# Patient Record
Sex: FEMALE | Race: OTHER | HISPANIC OR LATINO | URBAN - METROPOLITAN AREA
[De-identification: names, ages, dates, MRNs, and addresses within clinical notes are randomized per-mention and may not be internally consistent; named-entity substitution may affect disease eponyms.]

---

## 2017-04-12 ENCOUNTER — EMERGENCY (EMERGENCY)
Facility: HOSPITAL | Age: 27
LOS: 1 days | Discharge: LEFT BEFORE DISCHARGE | End: 2017-04-12
Attending: EMERGENCY MEDICINE | Admitting: EMERGENCY MEDICINE
Payer: SELF-PAY

## 2017-04-12 VITALS
OXYGEN SATURATION: 97 % | RESPIRATION RATE: 18 BRPM | DIASTOLIC BLOOD PRESSURE: 75 MMHG | HEART RATE: 92 BPM | SYSTOLIC BLOOD PRESSURE: 108 MMHG | TEMPERATURE: 98 F

## 2017-04-12 DIAGNOSIS — R51 HEADACHE: ICD-10-CM

## 2017-04-12 PROCEDURE — 99284 EMERGENCY DEPT VISIT MOD MDM: CPT

## 2017-04-12 PROCEDURE — 99284 EMERGENCY DEPT VISIT MOD MDM: CPT | Mod: 25

## 2017-04-12 RX ORDER — KETOROLAC TROMETHAMINE 30 MG/ML
15 SYRINGE (ML) INJECTION ONCE
Qty: 0 | Refills: 0 | Status: COMPLETED | OUTPATIENT
Start: 2017-04-12 | End: 2017-04-12

## 2017-04-12 NOTE — ED PROVIDER NOTE - MEDICAL DECISION MAKING DETAILS
CT head given history of skull surgery as child w/o follow up and location, ESR, CBC, CMP, Mg Kim: 25 yo F with Hx skull surgery c/o HA x 3 days with visual deficits and right sided weakness and neck stiffness. Pt took motrin without relief.   -CT head given history of skull surgery as child w/o follow up and location, ESR, CBC, CMP, Mg, reassess

## 2017-04-12 NOTE — ED PROVIDER NOTE - REFUSAL OF SERVICE, MDM
Patient was made aware of the risks and benefits of her leaving the hospital given her symptoms. CT head was ordered to determine whether a lesion was present and to rule out acute bleed. Patient was understand risks of possible death. Patient signed AMA form in the presence of the nurse and her  prior to discharge with understanding of the above.

## 2017-04-12 NOTE — ED PROVIDER NOTE - PROGRESS NOTE DETAILS
Kim: pt accepted the toradol and headache improved but refused to have labs drawn and CT head performed as she did not want to wait. The patient is leaving against medical advice.  The patient understands the risk of leaving without further evaluation and workup.  The patient however still declines and will follow up with their primary physician or says she would like to go to [facility].

## 2017-04-12 NOTE — ED PROVIDER NOTE - OBJECTIVE STATEMENT
27 yo F p/w c/o HA x 3 days. 25 yo F w/ pmhx of skull surgery p/w c/o HA x 3 days. Patient reports having throbbing pain on unilateral R side w/ visual deficits and R sided weakness. She reports pain in the jaw and tingling of the face. She states she has decreased sensation on the R side of the face in V1-V2 distribution. Denies any recent sick contacts, travel, or recent outdoor excursions. Patient took Motrin gel capsule 2 capsules q 4-6 hours w/o relief. She states it is worse lying down. Has no history of severe headaches. Reports neck stifness on the R side only without photophobia or fevers, chills, NS. No weight loss only gain. Patient does not follow with a neurologist. Denies any flashes or floaters.

## 2019-07-21 ENCOUNTER — OUTPATIENT (OUTPATIENT)
Dept: OUTPATIENT SERVICES | Facility: HOSPITAL | Age: 29
LOS: 1 days | Discharge: HOME | End: 2019-07-21

## 2019-07-21 ENCOUNTER — EMERGENCY (EMERGENCY)
Facility: HOSPITAL | Age: 29
LOS: 1 days | Discharge: STILL ADMITTED OR ADMINISTRATIVE | End: 2019-07-21
Admitting: EMERGENCY MEDICINE

## 2019-07-21 VITALS
DIASTOLIC BLOOD PRESSURE: 64 MMHG | OXYGEN SATURATION: 98 % | SYSTOLIC BLOOD PRESSURE: 110 MMHG | TEMPERATURE: 98 F | RESPIRATION RATE: 18 BRPM | HEART RATE: 103 BPM

## 2019-07-21 VITALS
OXYGEN SATURATION: 99 % | SYSTOLIC BLOOD PRESSURE: 94 MMHG | DIASTOLIC BLOOD PRESSURE: 53 MMHG | HEART RATE: 97 BPM | TEMPERATURE: 99 F | RESPIRATION RATE: 16 BRPM

## 2019-07-21 VITALS — HEART RATE: 97 BPM | SYSTOLIC BLOOD PRESSURE: 104 MMHG | DIASTOLIC BLOOD PRESSURE: 56 MMHG

## 2019-07-21 DIAGNOSIS — O26.899 OTHER SPECIFIED PREGNANCY RELATED CONDITIONS, UNSPECIFIED TRIMESTER: ICD-10-CM

## 2019-07-21 DIAGNOSIS — Z3A.00 WEEKS OF GESTATION OF PREGNANCY NOT SPECIFIED: ICD-10-CM

## 2019-07-21 LAB

## 2019-07-21 NOTE — OB PROVIDER TRIAGE NOTE - NSOBPROVIDERNOTE_OBGYN_ALL_OB_FT
No evidence of PTL. Discussed with Dr. Robertson/    -Pt cleared for discharge home  -PTL instructions given   -Pt instructed to follow up with next scheduled appointment 8/2/2019  -Verbal and written discharge instructions given, pt verbalized understanding

## 2019-07-21 NOTE — OB PROVIDER TRIAGE NOTE - HISTORY OF PRESENT ILLNESS
Default patient 28 y/o  @27.6 weeks gestation presents to triage with c/o intermittent lower abdominal pain and back pain since yesterday. Pt complains of vaginal discharge x 2 days. Pt denies VB. Pt reports good fetal movement. Pt recives Current Ap course significant for: Vaginal bleeding at 3 months, resolved  Medical H/x: Denies  Surgical H/x:  x1  Ob/Gyn H/x://8lbs 5oz/ Failure to dilate  Psych H/x: Denies  Meds: Pnv  NKDA Default patient 28 y/o  @27.6 weeks gestation presents to triage with c/o intermittent lower abdominal pain and back pain since yesterday. Pt complains of clear vaginal discharge x 2 days. Pt denies VB. Pt reports good fetal movement. Pt receives pnc in [place]  last ob visit 6/15/2019.  Current Ap course significant for: Vaginal bleeding at 3 months, resolved  Medical H/x: Denies  Surgical H/x:  x1  Ob/Gyn H/x:2011//8lbs 5oz/ Failure to dilate  Psych H/x: Denies  Meds: Pnv  NKDA Default patient 28 y/o  @27.6 weeks gestation presents to triage with c/o intermittent lower abdominal pain and back pain since yesterday. Pt complains of clear vaginal discharge x 2 hours. Pt denies VB. Pt reports good fetal movement. Pt receives PNC in [place]  last ob visit 6/15/2019.  Current Ap course significant for: Vaginal bleeding at 3 months, resolved  Medical H/x: Denies  Surgical H/x:  x1  Ob/Gyn H/x:2011//8lbs 5oz/ Failure to dilate  Psych H/x: Denies  Meds: Pnv  NKDA

## 2019-07-21 NOTE — ED ADULT TRIAGE NOTE - CHIEF COMPLAINT QUOTE
27 weeks 2 days pregnant, MD Christianson in [place], complaining of vaginal discharge, the sensation to push, and lower abdominal pain that started yesterday night. Also complains of hand/foot swelling. OK as per Jhonatan to send upstairs.

## 2019-07-21 NOTE — OB PROVIDER TRIAGE NOTE - NSHPPHYSICALEXAM_GEN_ALL_CORE
VSS  Abdomen: Soft, non-tender on palpation   SSE: Pt unable to tolerate speculum exam  Negative Nitrazine, negative fern  TVUS: Cervical Length 3.72cm, no funneling, no dynamic changes   TAUS: Cephalic presentation, Bpp:8/8, Debbie:17.39  NST:  [place]:  UA Pending VSS  Abdomen: Soft, non-tender on palpation   SSE: Pt unable to tolerate speculum exam  Negative Nitrazine, negative fern  TVUS: Cervical Length 3.72cm, no funneling, no dynamic changes   TAUS: Cephalic presentation, Posterior placenta, Bpp:8/8, Debbie:17.39  NST: Reactive  [place]: No ctx on toco   UA Pending

## 2019-07-21 NOTE — OB PROVIDER TRIAGE NOTE - ADDITIONAL INSTRUCTIONS
-Pt cleared for discharge home  -PTL instructions given   -Pt instructed to follow up with next scheduled appointment 8/2/2019  -Verbal and written discharge instructions given, pt verbalized understanding

## 2019-07-21 NOTE — OB PROVIDER TRIAGE NOTE - NSHPLABSRESULTS_GEN_ALL_CORE
Urinalysis Basic - ( 2019 22:00 )  Color: LIGHT YELLOW / Appearance: CLEAR / S.009 / pH: 6.5  Gluc: NEGATIVE / Ketone: NEGATIVE  / Bili: NEGATIVE / Urobili: NORMAL   Blood: NEGATIVE / Protein: NEGATIVE / Nitrite: NEGATIVE   Leuk Esterase: NEGATIVE / RBC: x / WBC x   Sq Epi: x / Non Sq Epi: x / Bacteria: x

## 2019-09-30 NOTE — ED ADULT NURSE NOTE - TEMPERATURE IN FAHRENHEIT (DEGREES F)
98.2
bilateral LE Active ROM was WFL  (within functional limits)/bilateral LE Passive ROM was WFL  (within functional limits)

## 2020-04-14 ENCOUNTER — INPATIENT (INPATIENT)
Facility: HOSPITAL | Age: 30
LOS: 3 days | Discharge: HOME | End: 2020-04-18
Attending: SURGERY
Payer: MEDICAID

## 2020-04-14 VITALS
OXYGEN SATURATION: 100 % | HEART RATE: 72 BPM | RESPIRATION RATE: 18 BRPM | DIASTOLIC BLOOD PRESSURE: 78 MMHG | SYSTOLIC BLOOD PRESSURE: 109 MMHG | TEMPERATURE: 98 F

## 2020-04-14 DIAGNOSIS — K81.0 ACUTE CHOLECYSTITIS: ICD-10-CM

## 2020-04-14 LAB
ALBUMIN SERPL ELPH-MCNC: 4.2 G/DL — SIGNIFICANT CHANGE UP (ref 3.3–5)
ALP SERPL-CCNC: 108 U/L — SIGNIFICANT CHANGE UP (ref 40–120)
ALT FLD-CCNC: 105 U/L — HIGH (ref 4–33)
ANION GAP SERPL CALC-SCNC: 12 MMO/L — SIGNIFICANT CHANGE UP (ref 7–14)
APPEARANCE UR: CLEAR — SIGNIFICANT CHANGE UP
AST SERPL-CCNC: 252 U/L — HIGH (ref 4–32)
BASOPHILS # BLD AUTO: 0.02 K/UL — SIGNIFICANT CHANGE UP (ref 0–0.2)
BASOPHILS NFR BLD AUTO: 0.2 % — SIGNIFICANT CHANGE UP (ref 0–2)
BILIRUB SERPL-MCNC: 1 MG/DL — SIGNIFICANT CHANGE UP (ref 0.2–1.2)
BILIRUB UR-MCNC: NEGATIVE — SIGNIFICANT CHANGE UP
BLOOD UR QL VISUAL: SIGNIFICANT CHANGE UP
BUN SERPL-MCNC: 8 MG/DL — SIGNIFICANT CHANGE UP (ref 7–23)
CALCIUM SERPL-MCNC: 9.6 MG/DL — SIGNIFICANT CHANGE UP (ref 8.4–10.5)
CHLORIDE SERPL-SCNC: 101 MMOL/L — SIGNIFICANT CHANGE UP (ref 98–107)
CO2 SERPL-SCNC: 23 MMOL/L — SIGNIFICANT CHANGE UP (ref 22–31)
COLOR SPEC: SIGNIFICANT CHANGE UP
CREAT SERPL-MCNC: 0.5 MG/DL — SIGNIFICANT CHANGE UP (ref 0.5–1.3)
EOSINOPHIL # BLD AUTO: 0.08 K/UL — SIGNIFICANT CHANGE UP (ref 0–0.5)
EOSINOPHIL NFR BLD AUTO: 0.9 % — SIGNIFICANT CHANGE UP (ref 0–6)
EPI CELLS # UR: SIGNIFICANT CHANGE UP
GLUCOSE SERPL-MCNC: 91 MG/DL — SIGNIFICANT CHANGE UP (ref 70–99)
GLUCOSE UR-MCNC: NEGATIVE — SIGNIFICANT CHANGE UP
HCT VFR BLD CALC: 38.9 % — SIGNIFICANT CHANGE UP (ref 34.5–45)
HGB BLD-MCNC: 13 G/DL — SIGNIFICANT CHANGE UP (ref 11.5–15.5)
IMM GRANULOCYTES NFR BLD AUTO: 0.2 % — SIGNIFICANT CHANGE UP (ref 0–1.5)
KETONES UR-MCNC: NEGATIVE — SIGNIFICANT CHANGE UP
LEUKOCYTE ESTERASE UR-ACNC: NEGATIVE — SIGNIFICANT CHANGE UP
LIDOCAIN IGE QN: 33.5 U/L — SIGNIFICANT CHANGE UP (ref 7–60)
LYMPHOCYTES # BLD AUTO: 1.27 K/UL — SIGNIFICANT CHANGE UP (ref 1–3.3)
LYMPHOCYTES # BLD AUTO: 13.8 % — SIGNIFICANT CHANGE UP (ref 13–44)
MCHC RBC-ENTMCNC: 27.8 PG — SIGNIFICANT CHANGE UP (ref 27–34)
MCHC RBC-ENTMCNC: 33.4 % — SIGNIFICANT CHANGE UP (ref 32–36)
MCV RBC AUTO: 83.1 FL — SIGNIFICANT CHANGE UP (ref 80–100)
MONOCYTES # BLD AUTO: 0.68 K/UL — SIGNIFICANT CHANGE UP (ref 0–0.9)
MONOCYTES NFR BLD AUTO: 7.4 % — SIGNIFICANT CHANGE UP (ref 2–14)
NEUTROPHILS # BLD AUTO: 7.14 K/UL — SIGNIFICANT CHANGE UP (ref 1.8–7.4)
NEUTROPHILS NFR BLD AUTO: 77.5 % — HIGH (ref 43–77)
NITRITE UR-MCNC: NEGATIVE — SIGNIFICANT CHANGE UP
NRBC # FLD: 0 K/UL — SIGNIFICANT CHANGE UP (ref 0–0)
PH UR: 7 — SIGNIFICANT CHANGE UP (ref 5–8)
PLATELET # BLD AUTO: 338 K/UL — SIGNIFICANT CHANGE UP (ref 150–400)
PMV BLD: 8.9 FL — SIGNIFICANT CHANGE UP (ref 7–13)
POTASSIUM SERPL-MCNC: 3.6 MMOL/L — SIGNIFICANT CHANGE UP (ref 3.5–5.3)
POTASSIUM SERPL-SCNC: 3.6 MMOL/L — SIGNIFICANT CHANGE UP (ref 3.5–5.3)
PROT SERPL-MCNC: 7.3 G/DL — SIGNIFICANT CHANGE UP (ref 6–8.3)
PROT UR-MCNC: NEGATIVE — SIGNIFICANT CHANGE UP
RBC # BLD: 4.68 M/UL — SIGNIFICANT CHANGE UP (ref 3.8–5.2)
RBC # FLD: 12.8 % — SIGNIFICANT CHANGE UP (ref 10.3–14.5)
RBC CASTS # UR COMP ASSIST: SIGNIFICANT CHANGE UP (ref 0–?)
SODIUM SERPL-SCNC: 136 MMOL/L — SIGNIFICANT CHANGE UP (ref 135–145)
SP GR SPEC: 1.01 — SIGNIFICANT CHANGE UP (ref 1–1.04)
UROBILINOGEN FLD QL: NORMAL — SIGNIFICANT CHANGE UP
WBC # BLD: 9.21 K/UL — SIGNIFICANT CHANGE UP (ref 3.8–10.5)
WBC # FLD AUTO: 9.21 K/UL — SIGNIFICANT CHANGE UP (ref 3.8–10.5)

## 2020-04-14 PROCEDURE — 76700 US EXAM ABDOM COMPLETE: CPT | Mod: 26

## 2020-04-14 RX ORDER — MORPHINE SULFATE 50 MG/1
2 CAPSULE, EXTENDED RELEASE ORAL ONCE
Refills: 0 | Status: DISCONTINUED | OUTPATIENT
Start: 2020-04-14 | End: 2020-04-14

## 2020-04-14 RX ORDER — ENOXAPARIN SODIUM 100 MG/ML
40 INJECTION SUBCUTANEOUS DAILY
Refills: 0 | Status: DISCONTINUED | OUTPATIENT
Start: 2020-04-14 | End: 2020-04-18

## 2020-04-14 RX ORDER — FAMOTIDINE 10 MG/ML
20 INJECTION INTRAVENOUS ONCE
Refills: 0 | Status: COMPLETED | OUTPATIENT
Start: 2020-04-14 | End: 2020-04-14

## 2020-04-14 RX ORDER — SODIUM CHLORIDE 9 MG/ML
1000 INJECTION INTRAMUSCULAR; INTRAVENOUS; SUBCUTANEOUS
Refills: 0 | Status: DISCONTINUED | OUTPATIENT
Start: 2020-04-14 | End: 2020-04-15

## 2020-04-14 RX ORDER — PIPERACILLIN AND TAZOBACTAM 4; .5 G/20ML; G/20ML
3.38 INJECTION, POWDER, LYOPHILIZED, FOR SOLUTION INTRAVENOUS ONCE
Refills: 0 | Status: COMPLETED | OUTPATIENT
Start: 2020-04-14 | End: 2020-04-14

## 2020-04-14 RX ORDER — SODIUM CHLORIDE 9 MG/ML
1000 INJECTION, SOLUTION INTRAVENOUS
Refills: 0 | Status: DISCONTINUED | OUTPATIENT
Start: 2020-04-14 | End: 2020-04-14

## 2020-04-14 RX ORDER — ONDANSETRON 8 MG/1
4 TABLET, FILM COATED ORAL ONCE
Refills: 0 | Status: COMPLETED | OUTPATIENT
Start: 2020-04-14 | End: 2020-04-14

## 2020-04-14 RX ORDER — ACETAMINOPHEN 500 MG
1000 TABLET ORAL EVERY 6 HOURS
Refills: 0 | Status: COMPLETED | OUTPATIENT
Start: 2020-04-14 | End: 2020-04-15

## 2020-04-14 RX ORDER — MORPHINE SULFATE 50 MG/1
2 CAPSULE, EXTENDED RELEASE ORAL EVERY 6 HOURS
Refills: 0 | Status: DISCONTINUED | OUTPATIENT
Start: 2020-04-14 | End: 2020-04-15

## 2020-04-14 RX ORDER — METOCLOPRAMIDE HCL 10 MG
10 TABLET ORAL ONCE
Refills: 0 | Status: COMPLETED | OUTPATIENT
Start: 2020-04-14 | End: 2020-04-14

## 2020-04-14 RX ORDER — PIPERACILLIN AND TAZOBACTAM 4; .5 G/20ML; G/20ML
3.38 INJECTION, POWDER, LYOPHILIZED, FOR SOLUTION INTRAVENOUS EVERY 8 HOURS
Refills: 0 | Status: DISCONTINUED | OUTPATIENT
Start: 2020-04-14 | End: 2020-04-16

## 2020-04-14 RX ORDER — KETOROLAC TROMETHAMINE 30 MG/ML
30 SYRINGE (ML) INJECTION EVERY 6 HOURS
Refills: 0 | Status: DISCONTINUED | OUTPATIENT
Start: 2020-04-14 | End: 2020-04-14

## 2020-04-14 RX ADMIN — PIPERACILLIN AND TAZOBACTAM 25 GRAM(S): 4; .5 INJECTION, POWDER, LYOPHILIZED, FOR SOLUTION INTRAVENOUS at 14:52

## 2020-04-14 RX ADMIN — MORPHINE SULFATE 2 MILLIGRAM(S): 50 CAPSULE, EXTENDED RELEASE ORAL at 08:03

## 2020-04-14 RX ADMIN — Medication 30 MILLIGRAM(S): at 17:43

## 2020-04-14 RX ADMIN — ENOXAPARIN SODIUM 40 MILLIGRAM(S): 100 INJECTION SUBCUTANEOUS at 17:43

## 2020-04-14 RX ADMIN — FAMOTIDINE 20 MILLIGRAM(S): 10 INJECTION INTRAVENOUS at 08:03

## 2020-04-14 RX ADMIN — PIPERACILLIN AND TAZOBACTAM 25 GRAM(S): 4; .5 INJECTION, POWDER, LYOPHILIZED, FOR SOLUTION INTRAVENOUS at 21:05

## 2020-04-14 RX ADMIN — PIPERACILLIN AND TAZOBACTAM 200 GRAM(S): 4; .5 INJECTION, POWDER, LYOPHILIZED, FOR SOLUTION INTRAVENOUS at 10:55

## 2020-04-14 RX ADMIN — MORPHINE SULFATE 2 MILLIGRAM(S): 50 CAPSULE, EXTENDED RELEASE ORAL at 14:52

## 2020-04-14 RX ADMIN — MORPHINE SULFATE 2 MILLIGRAM(S): 50 CAPSULE, EXTENDED RELEASE ORAL at 09:54

## 2020-04-14 RX ADMIN — SODIUM CHLORIDE 130 MILLILITER(S): 9 INJECTION INTRAMUSCULAR; INTRAVENOUS; SUBCUTANEOUS at 20:14

## 2020-04-14 RX ADMIN — MORPHINE SULFATE 2 MILLIGRAM(S): 50 CAPSULE, EXTENDED RELEASE ORAL at 22:05

## 2020-04-14 RX ADMIN — Medication 400 MILLIGRAM(S): at 17:42

## 2020-04-14 RX ADMIN — ONDANSETRON 4 MILLIGRAM(S): 8 TABLET, FILM COATED ORAL at 08:03

## 2020-04-14 RX ADMIN — SODIUM CHLORIDE 100 MILLILITER(S): 9 INJECTION, SOLUTION INTRAVENOUS at 13:23

## 2020-04-14 NOTE — ED ADULT NURSE REASSESSMENT NOTE - NS ED NURSE REASSESS COMMENT FT1
floor called to give report for bed 300c, PHILIP Ortiz will call back for report due to bed being dirty

## 2020-04-14 NOTE — ED PROVIDER NOTE - OBJECTIVE STATEMENT
30 y/o F recently dx gallstones c/o ruq pain since last night. Pt states pain is similar but worse from when she was dx with gallstones 3 months ago. Advised to hold off on sx apparently 3 motnhs ago as she was still nursing a 3 month old then. Denies fever, chills, Cp, SOB, cough, congestion, vomiting, diarrhea, dysuria, hematuria, frequency. LMP presently.

## 2020-04-14 NOTE — H&P ADULT - ASSESSMENT
29 F p/w recurrent biliary cholic vs acute cholecystitis  - Due to COVID 19 pandemic, we will comply with ACS guidelines and admit for pain control and IV hydration (cannot currently tolerate PO)  - NPO  - Zosyn  - IVF  - CBC, CMP tomorrow  - serial abd exam  - interval lap kenneth  - Standing toradol and tylenol IV, PRN morphine    KELLEN Kelly MD  PGY 4 Team B surgery

## 2020-04-14 NOTE — ED PROVIDER NOTE - CADM POA PRESS ULCER
No Detail Level: None Bill For Individual Tests Below?: no Venipuncture Paragraph: An alcohol pad was applied to the venipuncture site. Venipuncture was performed using a butterfly needle. Pressure and a bandaid was applied to the site. No complications were noted. Number Of Tubes Drawn: 2

## 2020-04-14 NOTE — H&P ADULT - HISTORY OF PRESENT ILLNESS
30 y/o F recently dx gallstones c/o ruq pain since 2AM. Pt states pain is similar but worse from when she was dx with gallstones 3 months ago. Advised to hold off on sx apparently 3 motnhs ago as she was still nursing a 3 month old then. Denies fever, chills, Cp, SOB, cough, congestion, vomiting, diarrhea, dysuria, hematuria, frequency. LMP presently.

## 2020-04-14 NOTE — ED ADULT NURSE REASSESSMENT NOTE - NS ED NURSE REASSESS COMMENT FT1
28yo female received in result waiting zone 3. pt A&Ox3, pmhx of stroke no residual noted, c/o of upper right abdominal pain and nauseas starting three hours ago. pain started without trigger, constant, radiates to the back, 7/10, accompanied by nauseas. Pt denies headache, chest pain, nausea/vomiting/diarrhea, fever, chill, dizziness, weakness, and shortness of breath at present. Pt respiration even and non-labored. Resting comfortably at this time. 20G IV in right antecubital, lab drawn and sent, pt medicated. will cont to monitor. 30yo female received in result waiting zone 3. pt A&Ox3, pmhx of stroke no residual noted, c/o of upper right abdominal pain and nauseas starting three hours ago. pain started without trigger, constant, radiates to the back, 7/10, accompanied by nauseas. Pt denies headache, chest pain, vomiting/diarrhea, fever, chill, dizziness, weakness, and shortness of breath at present. Pt respiration even and non-labored. Resting comfortably at this time. 20G IV in right antecubital, lab drawn and sent, pt medicated. will cont to monitor.

## 2020-04-14 NOTE — H&P ADULT - NSHPLABSRESULTS_GEN_ALL_CORE
CBC ( @ 07:50)                          13.0                     9.21    )--------------(  338        77.5<H>% Neuts, 13.8  % Lymphs, ANC: 7.14                            38.9      BMP ( @ 07:50)       136     |  101     |  8     			Ca++ --      Ca 9.6          ---------------------------------( 91    		Mg --           3.6     |  23      |  0.50  			Ph --        LFTs ( @ 07:50)      TPro 7.3 / Alb 4.2 / TBili 1.0 / DBili -- / <H> / <H> / AlkPhos 108            Urinalysis ( @ 07:35):     Color: LT. YELLOW / Appearance: CLEAR / S.009 / pH: 7.0 / Gluc: NEGATIVE / Ketones: NEGATIVE / Bili: NEGATIVE / Urobili: NORMAL / Protein :NEGATIVE / Nitrites: NEGATIVE / Leuk.Est: NEGATIVE / RBC: 0-2 / WBC:  / Sq Epi:  / Non Sq Epi: OCC / Bacteria          < from: US Abdomen Complete (20 @ 08:59) >    Bile ducts: Normal caliber. Common bile duct measures 3 mm.     Gallbladder: Adenomyomatosis. Cholelithiasis. Gallbladder wall measures at upper limits of normal, measuring 0.4cm. No pericholecystic fluid. Sonographic Raphael sign could not be assessed on this patient who received pain medication.    Pancreas: Visualized portions are within normal limits.    Spleen: 10.5 x 4.6 x 4.6 cm. Within normal limits.    Right kidney: 11.7 x 3.9 x 4.9 cm. No hydronephrosis.    Left kidney: 12.0 x 5.2 x 4.9 cm.  No hydronephrosis.    Ascites: None.    Aorta and IVC: Visualized portions are within normal limits.    IMPRESSION:     Cholelithiasis. Gallbladder wall is at upper limitsof normal for thickness. Sonographic Raphael sign could not be assessed on this patient who received pain medication. Findings are equivocal for acute cholecystitis.    < end of copied text >

## 2020-04-14 NOTE — H&P ADULT - NSHPPHYSICALEXAM_GEN_ALL_CORE
General: No acute distress, appears nontoxic  Neurologic: No focal deficits  Psychiatric: Appropriate affect  Cardiovascular: Regular rate and rhythm  Pulmonary: Unlabored breathing  Abdominal: Minimal TTP RUQ, no rebound, soft, nondistended  Extremities: No edema, moves all without limitations  Skin: Warm, no rashes

## 2020-04-15 ENCOUNTER — TRANSCRIPTION ENCOUNTER (OUTPATIENT)
Age: 30
End: 2020-04-15

## 2020-04-15 LAB
ALBUMIN SERPL ELPH-MCNC: 3.5 G/DL — SIGNIFICANT CHANGE UP (ref 3.3–5)
ALP SERPL-CCNC: 100 U/L — SIGNIFICANT CHANGE UP (ref 40–120)
ALT FLD-CCNC: 169 U/L — HIGH (ref 4–33)
ANION GAP SERPL CALC-SCNC: 12 MMO/L — SIGNIFICANT CHANGE UP (ref 7–14)
AST SERPL-CCNC: 102 U/L — HIGH (ref 4–32)
BILIRUB SERPL-MCNC: 1.1 MG/DL — SIGNIFICANT CHANGE UP (ref 0.2–1.2)
BUN SERPL-MCNC: 7 MG/DL — SIGNIFICANT CHANGE UP (ref 7–23)
CALCIUM SERPL-MCNC: 8.4 MG/DL — SIGNIFICANT CHANGE UP (ref 8.4–10.5)
CHLORIDE SERPL-SCNC: 105 MMOL/L — SIGNIFICANT CHANGE UP (ref 98–107)
CO2 SERPL-SCNC: 21 MMOL/L — LOW (ref 22–31)
CREAT SERPL-MCNC: 0.61 MG/DL — SIGNIFICANT CHANGE UP (ref 0.5–1.3)
CULTURE RESULTS: SIGNIFICANT CHANGE UP
GLUCOSE SERPL-MCNC: 85 MG/DL — SIGNIFICANT CHANGE UP (ref 70–99)
HCT VFR BLD CALC: 36.7 % — SIGNIFICANT CHANGE UP (ref 34.5–45)
HGB BLD-MCNC: 12.1 G/DL — SIGNIFICANT CHANGE UP (ref 11.5–15.5)
LIDOCAIN IGE QN: 40.8 U/L — SIGNIFICANT CHANGE UP (ref 7–60)
MCHC RBC-ENTMCNC: 28.1 PG — SIGNIFICANT CHANGE UP (ref 27–34)
MCHC RBC-ENTMCNC: 33 % — SIGNIFICANT CHANGE UP (ref 32–36)
MCV RBC AUTO: 85.2 FL — SIGNIFICANT CHANGE UP (ref 80–100)
NRBC # FLD: 0 K/UL — SIGNIFICANT CHANGE UP (ref 0–0)
PLATELET # BLD AUTO: 277 K/UL — SIGNIFICANT CHANGE UP (ref 150–400)
PMV BLD: 9.1 FL — SIGNIFICANT CHANGE UP (ref 7–13)
POTASSIUM SERPL-MCNC: 3.2 MMOL/L — LOW (ref 3.5–5.3)
POTASSIUM SERPL-SCNC: 3.2 MMOL/L — LOW (ref 3.5–5.3)
PROT SERPL-MCNC: 6.2 G/DL — SIGNIFICANT CHANGE UP (ref 6–8.3)
RBC # BLD: 4.31 M/UL — SIGNIFICANT CHANGE UP (ref 3.8–5.2)
RBC # FLD: 13 % — SIGNIFICANT CHANGE UP (ref 10.3–14.5)
SODIUM SERPL-SCNC: 138 MMOL/L — SIGNIFICANT CHANGE UP (ref 135–145)
SPECIMEN SOURCE: SIGNIFICANT CHANGE UP
WBC # BLD: 4.06 K/UL — SIGNIFICANT CHANGE UP (ref 3.8–10.5)
WBC # FLD AUTO: 4.06 K/UL — SIGNIFICANT CHANGE UP (ref 3.8–10.5)

## 2020-04-15 PROCEDURE — 99223 1ST HOSP IP/OBS HIGH 75: CPT | Mod: 57

## 2020-04-15 RX ORDER — POTASSIUM CHLORIDE 20 MEQ
40 PACKET (EA) ORAL EVERY 4 HOURS
Refills: 0 | Status: COMPLETED | OUTPATIENT
Start: 2020-04-15 | End: 2020-04-15

## 2020-04-15 RX ORDER — OXYCODONE HYDROCHLORIDE 5 MG/1
10 TABLET ORAL EVERY 4 HOURS
Refills: 0 | Status: DISCONTINUED | OUTPATIENT
Start: 2020-04-15 | End: 2020-04-16

## 2020-04-15 RX ORDER — SODIUM CHLORIDE 9 MG/ML
1000 INJECTION, SOLUTION INTRAVENOUS
Refills: 0 | Status: DISCONTINUED | OUTPATIENT
Start: 2020-04-15 | End: 2020-04-17

## 2020-04-15 RX ORDER — OXYCODONE HYDROCHLORIDE 5 MG/1
5 TABLET ORAL EVERY 4 HOURS
Refills: 0 | Status: DISCONTINUED | OUTPATIENT
Start: 2020-04-15 | End: 2020-04-16

## 2020-04-15 RX ORDER — ACETAMINOPHEN 500 MG
1000 TABLET ORAL EVERY 6 HOURS
Refills: 0 | Status: DISCONTINUED | OUTPATIENT
Start: 2020-04-15 | End: 2020-04-16

## 2020-04-15 RX ADMIN — OXYCODONE HYDROCHLORIDE 10 MILLIGRAM(S): 5 TABLET ORAL at 21:46

## 2020-04-15 RX ADMIN — PIPERACILLIN AND TAZOBACTAM 25 GRAM(S): 4; .5 INJECTION, POWDER, LYOPHILIZED, FOR SOLUTION INTRAVENOUS at 21:47

## 2020-04-15 RX ADMIN — Medication 400 MILLIGRAM(S): at 05:25

## 2020-04-15 RX ADMIN — PIPERACILLIN AND TAZOBACTAM 25 GRAM(S): 4; .5 INJECTION, POWDER, LYOPHILIZED, FOR SOLUTION INTRAVENOUS at 12:35

## 2020-04-15 RX ADMIN — PIPERACILLIN AND TAZOBACTAM 25 GRAM(S): 4; .5 INJECTION, POWDER, LYOPHILIZED, FOR SOLUTION INTRAVENOUS at 05:25

## 2020-04-15 RX ADMIN — ENOXAPARIN SODIUM 40 MILLIGRAM(S): 100 INJECTION SUBCUTANEOUS at 12:35

## 2020-04-15 RX ADMIN — SODIUM CHLORIDE 130 MILLILITER(S): 9 INJECTION INTRAMUSCULAR; INTRAVENOUS; SUBCUTANEOUS at 04:06

## 2020-04-15 RX ADMIN — MORPHINE SULFATE 2 MILLIGRAM(S): 50 CAPSULE, EXTENDED RELEASE ORAL at 10:40

## 2020-04-15 RX ADMIN — Medication 400 MILLIGRAM(S): at 12:34

## 2020-04-15 RX ADMIN — SODIUM CHLORIDE 130 MILLILITER(S): 9 INJECTION INTRAMUSCULAR; INTRAVENOUS; SUBCUTANEOUS at 10:40

## 2020-04-15 RX ADMIN — SODIUM CHLORIDE 100 MILLILITER(S): 9 INJECTION, SOLUTION INTRAVENOUS at 12:54

## 2020-04-15 RX ADMIN — Medication 400 MILLIGRAM(S): at 01:03

## 2020-04-15 RX ADMIN — MORPHINE SULFATE 2 MILLIGRAM(S): 50 CAPSULE, EXTENDED RELEASE ORAL at 16:44

## 2020-04-15 RX ADMIN — Medication 40 MILLIEQUIVALENT(S): at 17:56

## 2020-04-15 RX ADMIN — Medication 40 MILLIEQUIVALENT(S): at 12:34

## 2020-04-15 NOTE — PROGRESS NOTE ADULT - ASSESSMENT
28yo F p/w recurrent biliary cholic vs acute cholecystitis. Found to have cholelithiasis. Pain improving, remains afebrile.     - Due to COVID 19 pandemic, we will comply with ACS guidelines for pain control and IV hydration (cannot currently tolerate PO)  - NPO  - Zosyn  - f/u CBC, CMP   - serial abd exam  - interval lap kenneth  - Standing toradol and tylenol IV, PRN morphine    [facility] Team B Pager: #[phone]

## 2020-04-16 ENCOUNTER — TRANSCRIPTION ENCOUNTER (OUTPATIENT)
Age: 30
End: 2020-04-16

## 2020-04-16 ENCOUNTER — RESULT REVIEW (OUTPATIENT)
Age: 30
End: 2020-04-16

## 2020-04-16 LAB
ALBUMIN SERPL ELPH-MCNC: 3.7 G/DL — SIGNIFICANT CHANGE UP (ref 3.3–5)
ALP SERPL-CCNC: 105 U/L — SIGNIFICANT CHANGE UP (ref 40–120)
ALT FLD-CCNC: 137 U/L — HIGH (ref 4–33)
ANION GAP SERPL CALC-SCNC: 11 MMO/L — SIGNIFICANT CHANGE UP (ref 7–14)
APTT BLD: 34.7 SEC — SIGNIFICANT CHANGE UP (ref 27.5–36.3)
AST SERPL-CCNC: 65 U/L — HIGH (ref 4–32)
BILIRUB SERPL-MCNC: 1.3 MG/DL — HIGH (ref 0.2–1.2)
BLD GP AB SCN SERPL QL: NEGATIVE — SIGNIFICANT CHANGE UP
BUN SERPL-MCNC: 4 MG/DL — LOW (ref 7–23)
CALCIUM SERPL-MCNC: 9 MG/DL — SIGNIFICANT CHANGE UP (ref 8.4–10.5)
CHLORIDE SERPL-SCNC: 104 MMOL/L — SIGNIFICANT CHANGE UP (ref 98–107)
CO2 SERPL-SCNC: 21 MMOL/L — LOW (ref 22–31)
CREAT SERPL-MCNC: 0.6 MG/DL — SIGNIFICANT CHANGE UP (ref 0.5–1.3)
GLUCOSE SERPL-MCNC: 88 MG/DL — SIGNIFICANT CHANGE UP (ref 70–99)
HCG SERPL-ACNC: < 5 MIU/ML — SIGNIFICANT CHANGE UP
HCT VFR BLD CALC: 38.5 % — SIGNIFICANT CHANGE UP (ref 34.5–45)
HGB BLD-MCNC: 12.6 G/DL — SIGNIFICANT CHANGE UP (ref 11.5–15.5)
INR BLD: 1.07 — SIGNIFICANT CHANGE UP (ref 0.88–1.17)
MAGNESIUM SERPL-MCNC: 2.2 MG/DL — SIGNIFICANT CHANGE UP (ref 1.6–2.6)
MCHC RBC-ENTMCNC: 28.1 PG — SIGNIFICANT CHANGE UP (ref 27–34)
MCHC RBC-ENTMCNC: 32.7 % — SIGNIFICANT CHANGE UP (ref 32–36)
MCV RBC AUTO: 85.7 FL — SIGNIFICANT CHANGE UP (ref 80–100)
NRBC # FLD: 0 K/UL — SIGNIFICANT CHANGE UP (ref 0–0)
PHOSPHATE SERPL-MCNC: 3.1 MG/DL — SIGNIFICANT CHANGE UP (ref 2.5–4.5)
PLATELET # BLD AUTO: 279 K/UL — SIGNIFICANT CHANGE UP (ref 150–400)
PMV BLD: 8.7 FL — SIGNIFICANT CHANGE UP (ref 7–13)
POTASSIUM SERPL-MCNC: 3.9 MMOL/L — SIGNIFICANT CHANGE UP (ref 3.5–5.3)
POTASSIUM SERPL-SCNC: 3.9 MMOL/L — SIGNIFICANT CHANGE UP (ref 3.5–5.3)
PROT SERPL-MCNC: 7 G/DL — SIGNIFICANT CHANGE UP (ref 6–8.3)
PROTHROM AB SERPL-ACNC: 12.2 SEC — SIGNIFICANT CHANGE UP (ref 9.8–13.1)
RBC # BLD: 4.49 M/UL — SIGNIFICANT CHANGE UP (ref 3.8–5.2)
RBC # FLD: 12.7 % — SIGNIFICANT CHANGE UP (ref 10.3–14.5)
RH IG SCN BLD-IMP: POSITIVE — SIGNIFICANT CHANGE UP
RH IG SCN BLD-IMP: POSITIVE — SIGNIFICANT CHANGE UP
SODIUM SERPL-SCNC: 136 MMOL/L — SIGNIFICANT CHANGE UP (ref 135–145)
WBC # BLD: 4.78 K/UL — SIGNIFICANT CHANGE UP (ref 3.8–10.5)
WBC # FLD AUTO: 4.78 K/UL — SIGNIFICANT CHANGE UP (ref 3.8–10.5)

## 2020-04-16 PROCEDURE — 88304 TISSUE EXAM BY PATHOLOGIST: CPT | Mod: 26

## 2020-04-16 PROCEDURE — 47562 LAPAROSCOPIC CHOLECYSTECTOMY: CPT

## 2020-04-16 RX ORDER — OXYCODONE HYDROCHLORIDE 5 MG/1
1 TABLET ORAL
Qty: 12 | Refills: 0
Start: 2020-04-16 | End: 2020-04-17

## 2020-04-16 RX ORDER — ONDANSETRON 8 MG/1
4 TABLET, FILM COATED ORAL ONCE
Refills: 0 | Status: COMPLETED | OUTPATIENT
Start: 2020-04-16 | End: 2020-04-16

## 2020-04-16 RX ORDER — KETOROLAC TROMETHAMINE 30 MG/ML
15 SYRINGE (ML) INJECTION EVERY 6 HOURS
Refills: 0 | Status: DISCONTINUED | OUTPATIENT
Start: 2020-04-16 | End: 2020-04-17

## 2020-04-16 RX ORDER — HYDROMORPHONE HYDROCHLORIDE 2 MG/ML
0.5 INJECTION INTRAMUSCULAR; INTRAVENOUS; SUBCUTANEOUS EVERY 4 HOURS
Refills: 0 | Status: DISCONTINUED | OUTPATIENT
Start: 2020-04-16 | End: 2020-04-17

## 2020-04-16 RX ORDER — HYDROMORPHONE HYDROCHLORIDE 2 MG/ML
1 INJECTION INTRAMUSCULAR; INTRAVENOUS; SUBCUTANEOUS
Qty: 12 | Refills: 0
Start: 2020-04-16 | End: 2020-04-17

## 2020-04-16 RX ORDER — HYDROMORPHONE HYDROCHLORIDE 2 MG/ML
4 INJECTION INTRAMUSCULAR; INTRAVENOUS; SUBCUTANEOUS EVERY 4 HOURS
Refills: 0 | Status: DISCONTINUED | OUTPATIENT
Start: 2020-04-16 | End: 2020-04-16

## 2020-04-16 RX ORDER — DIPHENHYDRAMINE HCL 50 MG
25 CAPSULE ORAL ONCE
Refills: 0 | Status: COMPLETED | OUTPATIENT
Start: 2020-04-16 | End: 2020-04-16

## 2020-04-16 RX ORDER — HYDROMORPHONE HYDROCHLORIDE 2 MG/ML
2 INJECTION INTRAMUSCULAR; INTRAVENOUS; SUBCUTANEOUS EVERY 4 HOURS
Refills: 0 | Status: DISCONTINUED | OUTPATIENT
Start: 2020-04-16 | End: 2020-04-16

## 2020-04-16 RX ORDER — HYDROMORPHONE HYDROCHLORIDE 2 MG/ML
1 INJECTION INTRAMUSCULAR; INTRAVENOUS; SUBCUTANEOUS EVERY 4 HOURS
Refills: 0 | Status: DISCONTINUED | OUTPATIENT
Start: 2020-04-16 | End: 2020-04-17

## 2020-04-16 RX ORDER — FENTANYL CITRATE 50 UG/ML
25 INJECTION INTRAVENOUS
Refills: 0 | Status: DISCONTINUED | OUTPATIENT
Start: 2020-04-16 | End: 2020-04-17

## 2020-04-16 RX ORDER — ACETAMINOPHEN 500 MG
950 TABLET ORAL EVERY 6 HOURS
Refills: 0 | Status: DISCONTINUED | OUTPATIENT
Start: 2020-04-16 | End: 2020-04-17

## 2020-04-16 RX ADMIN — ONDANSETRON 4 MILLIGRAM(S): 8 TABLET, FILM COATED ORAL at 19:51

## 2020-04-16 RX ADMIN — FENTANYL CITRATE 25 MICROGRAM(S): 50 INJECTION INTRAVENOUS at 17:20

## 2020-04-16 RX ADMIN — Medication 400 MILLIGRAM(S): at 06:49

## 2020-04-16 RX ADMIN — ENOXAPARIN SODIUM 40 MILLIGRAM(S): 100 INJECTION SUBCUTANEOUS at 13:04

## 2020-04-16 RX ADMIN — HYDROMORPHONE HYDROCHLORIDE 4 MILLIGRAM(S): 2 INJECTION INTRAMUSCULAR; INTRAVENOUS; SUBCUTANEOUS at 12:00

## 2020-04-16 RX ADMIN — HYDROMORPHONE HYDROCHLORIDE 4 MILLIGRAM(S): 2 INJECTION INTRAMUSCULAR; INTRAVENOUS; SUBCUTANEOUS at 18:30

## 2020-04-16 RX ADMIN — SODIUM CHLORIDE 100 MILLILITER(S): 9 INJECTION, SOLUTION INTRAVENOUS at 21:00

## 2020-04-16 RX ADMIN — Medication 25 MILLIGRAM(S): at 01:01

## 2020-04-16 RX ADMIN — SODIUM CHLORIDE 100 MILLILITER(S): 9 INJECTION, SOLUTION INTRAVENOUS at 01:03

## 2020-04-16 RX ADMIN — HYDROMORPHONE HYDROCHLORIDE 1 MILLIGRAM(S): 2 INJECTION INTRAMUSCULAR; INTRAVENOUS; SUBCUTANEOUS at 21:49

## 2020-04-16 RX ADMIN — ONDANSETRON 4 MILLIGRAM(S): 8 TABLET, FILM COATED ORAL at 17:07

## 2020-04-16 RX ADMIN — PIPERACILLIN AND TAZOBACTAM 25 GRAM(S): 4; .5 INJECTION, POWDER, LYOPHILIZED, FOR SOLUTION INTRAVENOUS at 06:49

## 2020-04-16 RX ADMIN — PIPERACILLIN AND TAZOBACTAM 25 GRAM(S): 4; .5 INJECTION, POWDER, LYOPHILIZED, FOR SOLUTION INTRAVENOUS at 21:49

## 2020-04-16 RX ADMIN — Medication 400 MILLIGRAM(S): at 00:27

## 2020-04-16 NOTE — PROGRESS NOTE ADULT - ASSESSMENT
28yo F p/w recurrent biliary cholic vs acute cholecystitis. Found to have cholelithiasis. Pain improving, remains afebrile.     - CLD   - c/w Zosyn  - f/u CBC, CMP   - serial abd exam  - interval lap kenneth  - Due to COVID 19 pandemic, we will comply with ACS guidelines for pain control and IV hydration. Plan for interval lap kenneth.     [facility] Team B Pager: #[phone]

## 2020-04-16 NOTE — DISCHARGE NOTE PROVIDER - NSDCMRMEDTOKEN_GEN_ALL_CORE_FT
oxyCODONE 5 mg oral tablet: 1 tab(s) orally every 4 hours MDD:6 Tabs  prenatal vitamins: Dilaudid 2 mg oral tablet: 1 tab(s) orally every 4 hours MDD:6 Tabs  prenatal vitamins: acetaminophen 325 mg oral tablet: 2 tab(s) orally every 6 hours  Dilaudid 2 mg oral tablet: 1 tab(s) orally every 4 hours MDD:6 Tabs  ibuprofen 600 mg oral tablet: 1 tab(s) orally every 6 hours  prenatal vitamins:

## 2020-04-16 NOTE — DISCHARGE NOTE PROVIDER - NSDCFUADDINST_GEN_ALL_CORE_FT
WOUND CARE:  Please keep incisions clean and dry. Please do not Scrub or rub incisions. Do not use lotion or powder on incisions.   BATHING: You may shower and/or sponge bathe. You may use warm soapy water in the shower and rinse, pat dry.  ACTIVITY: No heavy lifting or straining. Otherwise, you may return to your usual level of physical activity. If you are taking narcotic pain medication DO NOT drive a car, operate machinery or make important decisions.  DIET: Return to your usual diet.  NOTIFY YOUR SURGEON IF YOU HAVE: any bleeding that does not stop, any pus draining from your wound(s), any fever (over 100.4 F) persistent nausea/vomiting, or if your pain is not controlled on your discharge pain medications, unable to urinate.  Please follow up with your primary care physician in one week regarding your hospitalization, bring copies of your discharge paperwork. WOUND CARE:  Please keep incisions clean and dry. Please do not Scrub or rub incisions. Do not use lotion or powder on incisions.   BATHING: You may shower and/or sponge bathe. You may use warm soapy water in the shower and rinse, pat dry.  ACTIVITY: No heavy lifting or straining. Otherwise, you may return to your usual level of physical activity. If you are taking narcotic pain medication DO NOT drive a car, operate machinery or make important decisions.  DIET: Return to your usual diet.  NOTIFY YOUR SURGEON IF YOU HAVE: any bleeding that does not stop, any pus draining from your wound(s), any fever (over 100.4 F) persistent nausea/vomiting, or if your pain is not controlled on your discharge pain medications, unable to urinate.  Please follow up with your primary care physician regarding your hospitalization, bring copies of your discharge paperwork.

## 2020-04-16 NOTE — DISCHARGE NOTE PROVIDER - NSDCFUADDAPPT_GEN_ALL_CORE_FT
Please follow up with Dr. Mckeon as needed after surgery. Please call his office to set up an appointment. Please follow up with Dr. Mckeon as needed after surgery. Please call his office to set up an appointment.    Please take Tylenol 500-650 mg every 6 hours and Ibuprofen 400 mg every 6 hours for pain control. If not controlled by Tylenol and Ibuprofen take Dilaudid 2mg every 4 hours as needed Please follow up with Dr. Mckeon 2 weeks after discharge. Please call his office to set up an appointment.    Please take Tylenol 500-650 mg every 6 hours and Ibuprofen 400 mg every 6 hours for pain control. If not controlled by Tylenol and Ibuprofen take Dilaudid 2mg every 4 hours as needed

## 2020-04-16 NOTE — PROVIDER CONTACT NOTE (OTHER) - SITUATION
Patient complaint of itchiness after taking oxycodone. Redness noted to chest and arms. Patient reported she has never taken oxycodone before.

## 2020-04-16 NOTE — DISCHARGE NOTE PROVIDER - CARE PROVIDER_API CALL
Miguel Mckeon)  Surgery  [address] [place] [address]  Phone: [phone]  Fax: [phone]  Follow Up Time: Routine

## 2020-04-16 NOTE — DISCHARGE NOTE PROVIDER - HOSPITAL COURSE
HPI:    30 y/o F recently dx gallstones c/o ruq pain since 2AM. Pt states pain is similar but worse from when she was dx with gallstones 3 months ago. Advised to hold off on sx apparently 3 motnhs ago as she was still nursing a 3 month old then. Denies fever, chills, Cp, SOB, cough, congestion, vomiting, diarrhea, dysuria, hematuria, frequency. LMP presently.            HOSPITAL COURSE:    Patient was admitted and placed on IV Zosyn and made NPO with IV fluids. Her pain worsened on HD3 with that advancement of her diet to clear liquids and she was taken to the OR for Laparoscopic Cholecystectomy with Dr. Mckeon. She tolerated the procedure well and by then end of the day was tolerating a regular diet and pain was well controlled. On POD0 she was deemed ready for discharge. At time of discharge, the patient was hemodynamically stable, was tolerating PO diet, was voiding urine and passing stool, was ambulating, and was comfortable with adequate pain control. The patient was instructed to follow up with Dr. Mckeon within 1-2 weeks after discharge from the hospital. The patient/family felt comfortable with discharge. The patient was discharged to home. The patient had no other issues and was recovering appropriately. HPI:    30 y/o F recently dx gallstones c/o ruq pain since 2AM. Pt states pain is similar but worse from when she was dx with gallstones 3 months ago. Advised to hold off on sx apparently 3 motnhs ago as she was still nursing a 3 month old then. Denies fever, chills, Cp, SOB, cough, congestion, vomiting, diarrhea, dysuria, hematuria, frequency. LMP presently.            HOSPITAL COURSE:    Patient was admitted and placed on IV Zosyn and made NPO with IV fluids. Her pain worsened on HD3 with advancement of her diet to clear liquids and she was taken to the OR for Laparoscopic Cholecystectomy with Dr. Mckeon. She tolerated the procedure well and by then end of the day was tolerating a regular diet and pain was well controlled. On POD0 she was deemed ready for discharge. She requested to stay overnight as her pain worsened into the evening. POD1 she continued to have abdominal pain that was not well controlled. This was accompanied by lightheadedness, nausea and vomiting. She was observed again overnight with no issues. POD2 *****         At time of discharge, the patient was hemodynamically stable, was tolerating PO diet, was voiding urine and passing stool, was ambulating, and was comfortable with adequate pain control. The patient was instructed to follow up with Dr. Mckeon within 1-2 weeks after discharge from the hospital. The patient/family felt comfortable with discharge. The patient was discharged to home. The patient had no other issues and was recovering appropriately. HPI:    28 y/o F recently dx gallstones c/o ruq pain since 2AM. Pt states pain is similar but worse from when she was dx with gallstones 3 months ago. Advised to hold off on sx apparently 3 motnhs ago as she was still nursing a 3 month old then. Denies fever, chills, Cp, SOB, cough, congestion, vomiting, diarrhea, dysuria, hematuria, frequency. LMP presently.            HOSPITAL COURSE:    Patient was admitted and placed on IV Zosyn and made NPO with IV fluids. Her pain worsened on HD3 with advancement of her diet to clear liquids and she was taken to the OR for Laparoscopic Cholecystectomy with Dr. Mckeon. She tolerated the procedure well and by then end of the day was tolerating a regular diet and pain was well controlled. On POD0 she was deemed ready for discharge. She requested to stay overnight as her pain worsened into the evening. POD1 she continued to have abdominal pain that was not well controlled. This was accompanied by lightheadedness, nausea and vomiting. She was observed again overnight with no issues. POD2 pain well controlled and pt tolerating regular diet without worsening pain, nausea of vomiting. Lightheadedness resolved.         At time of discharge, the patient was hemodynamically stable, was tolerating PO diet, was voiding urine and passing stool, was ambulating, and was comfortable with adequate pain control. The patient was instructed to follow up with Dr. Mckeon within 1-2 weeks after discharge from the hospital. The patient/family felt comfortable with discharge. The patient was discharged to home. The patient had no other issues and was recovering appropriately.

## 2020-04-16 NOTE — BRIEF OPERATIVE NOTE - OPERATION/FINDINGS
Laparoscopic cholecystectomy performed. Omental adhesions to gallbladder taken down with electrocautery. Peritoneum divided, and dissection performed until critical view of cystic duct and artery obtained. Cystic artery and duct ligated and divided sharply. Gallbladder dissected from liver bed with electrocautery. Clips in place, and surgical bed hemostatic at close of case.

## 2020-04-16 NOTE — PRE-OP CHECKLIST - 1.
Pt requests to please have brother called after surgery to notify him of her status (her mother does not speak English well)   Brother: Atilio [phone]

## 2020-04-17 LAB
ALBUMIN SERPL ELPH-MCNC: 3.6 G/DL — SIGNIFICANT CHANGE UP (ref 3.3–5)
ALP SERPL-CCNC: 104 U/L — SIGNIFICANT CHANGE UP (ref 40–120)
ALT FLD-CCNC: 137 U/L — HIGH (ref 4–33)
ANION GAP SERPL CALC-SCNC: 10 MMO/L — SIGNIFICANT CHANGE UP (ref 7–14)
ANION GAP SERPL CALC-SCNC: 10 MMO/L — SIGNIFICANT CHANGE UP (ref 7–14)
AST SERPL-CCNC: 80 U/L — HIGH (ref 4–32)
BILIRUB SERPL-MCNC: 1.1 MG/DL — SIGNIFICANT CHANGE UP (ref 0.2–1.2)
BLD GP AB SCN SERPL QL: NEGATIVE — SIGNIFICANT CHANGE UP
BUN SERPL-MCNC: 6 MG/DL — LOW (ref 7–23)
BUN SERPL-MCNC: 8 MG/DL — SIGNIFICANT CHANGE UP (ref 7–23)
CALCIUM SERPL-MCNC: 8.7 MG/DL — SIGNIFICANT CHANGE UP (ref 8.4–10.5)
CALCIUM SERPL-MCNC: 8.9 MG/DL — SIGNIFICANT CHANGE UP (ref 8.4–10.5)
CHLORIDE SERPL-SCNC: 104 MMOL/L — SIGNIFICANT CHANGE UP (ref 98–107)
CHLORIDE SERPL-SCNC: 105 MMOL/L — SIGNIFICANT CHANGE UP (ref 98–107)
CO2 SERPL-SCNC: 23 MMOL/L — SIGNIFICANT CHANGE UP (ref 22–31)
CO2 SERPL-SCNC: 25 MMOL/L — SIGNIFICANT CHANGE UP (ref 22–31)
CREAT SERPL-MCNC: 0.54 MG/DL — SIGNIFICANT CHANGE UP (ref 0.5–1.3)
CREAT SERPL-MCNC: 0.61 MG/DL — SIGNIFICANT CHANGE UP (ref 0.5–1.3)
GLUCOSE SERPL-MCNC: 90 MG/DL — SIGNIFICANT CHANGE UP (ref 70–99)
GLUCOSE SERPL-MCNC: 98 MG/DL — SIGNIFICANT CHANGE UP (ref 70–99)
HCT VFR BLD CALC: 35.4 % — SIGNIFICANT CHANGE UP (ref 34.5–45)
HCT VFR BLD CALC: 36.2 % — SIGNIFICANT CHANGE UP (ref 34.5–45)
HGB BLD-MCNC: 11.9 G/DL — SIGNIFICANT CHANGE UP (ref 11.5–15.5)
HGB BLD-MCNC: 12.1 G/DL — SIGNIFICANT CHANGE UP (ref 11.5–15.5)
MAGNESIUM SERPL-MCNC: 1.9 MG/DL — SIGNIFICANT CHANGE UP (ref 1.6–2.6)
MAGNESIUM SERPL-MCNC: 2 MG/DL — SIGNIFICANT CHANGE UP (ref 1.6–2.6)
MCHC RBC-ENTMCNC: 28.2 PG — SIGNIFICANT CHANGE UP (ref 27–34)
MCHC RBC-ENTMCNC: 28.3 PG — SIGNIFICANT CHANGE UP (ref 27–34)
MCHC RBC-ENTMCNC: 33.4 % — SIGNIFICANT CHANGE UP (ref 32–36)
MCHC RBC-ENTMCNC: 33.6 % — SIGNIFICANT CHANGE UP (ref 32–36)
MCV RBC AUTO: 84.3 FL — SIGNIFICANT CHANGE UP (ref 80–100)
MCV RBC AUTO: 84.4 FL — SIGNIFICANT CHANGE UP (ref 80–100)
NRBC # FLD: 0 K/UL — SIGNIFICANT CHANGE UP (ref 0–0)
NRBC # FLD: 0 K/UL — SIGNIFICANT CHANGE UP (ref 0–0)
PHOSPHATE SERPL-MCNC: 2.4 MG/DL — LOW (ref 2.5–4.5)
PHOSPHATE SERPL-MCNC: 2.5 MG/DL — SIGNIFICANT CHANGE UP (ref 2.5–4.5)
PLATELET # BLD AUTO: 275 K/UL — SIGNIFICANT CHANGE UP (ref 150–400)
PLATELET # BLD AUTO: 300 K/UL — SIGNIFICANT CHANGE UP (ref 150–400)
PMV BLD: 8.7 FL — SIGNIFICANT CHANGE UP (ref 7–13)
PMV BLD: 8.8 FL — SIGNIFICANT CHANGE UP (ref 7–13)
POTASSIUM SERPL-MCNC: 3.6 MMOL/L — SIGNIFICANT CHANGE UP (ref 3.5–5.3)
POTASSIUM SERPL-MCNC: 3.7 MMOL/L — SIGNIFICANT CHANGE UP (ref 3.5–5.3)
POTASSIUM SERPL-SCNC: 3.6 MMOL/L — SIGNIFICANT CHANGE UP (ref 3.5–5.3)
POTASSIUM SERPL-SCNC: 3.7 MMOL/L — SIGNIFICANT CHANGE UP (ref 3.5–5.3)
PROT SERPL-MCNC: 6.7 G/DL — SIGNIFICANT CHANGE UP (ref 6–8.3)
RBC # BLD: 4.2 M/UL — SIGNIFICANT CHANGE UP (ref 3.8–5.2)
RBC # BLD: 4.29 M/UL — SIGNIFICANT CHANGE UP (ref 3.8–5.2)
RBC # FLD: 12.8 % — SIGNIFICANT CHANGE UP (ref 10.3–14.5)
RBC # FLD: 12.9 % — SIGNIFICANT CHANGE UP (ref 10.3–14.5)
RH IG SCN BLD-IMP: POSITIVE — SIGNIFICANT CHANGE UP
SODIUM SERPL-SCNC: 137 MMOL/L — SIGNIFICANT CHANGE UP (ref 135–145)
SODIUM SERPL-SCNC: 140 MMOL/L — SIGNIFICANT CHANGE UP (ref 135–145)
WBC # BLD: 5.62 K/UL — SIGNIFICANT CHANGE UP (ref 3.8–10.5)
WBC # BLD: 6.22 K/UL — SIGNIFICANT CHANGE UP (ref 3.8–10.5)
WBC # FLD AUTO: 5.62 K/UL — SIGNIFICANT CHANGE UP (ref 3.8–10.5)
WBC # FLD AUTO: 6.22 K/UL — SIGNIFICANT CHANGE UP (ref 3.8–10.5)

## 2020-04-17 RX ORDER — ACETAMINOPHEN 500 MG
650 TABLET ORAL EVERY 6 HOURS
Refills: 0 | Status: DISCONTINUED | OUTPATIENT
Start: 2020-04-17 | End: 2020-04-18

## 2020-04-17 RX ORDER — HYDROMORPHONE HYDROCHLORIDE 2 MG/ML
1 INJECTION INTRAMUSCULAR; INTRAVENOUS; SUBCUTANEOUS EVERY 4 HOURS
Refills: 0 | Status: DISCONTINUED | OUTPATIENT
Start: 2020-04-17 | End: 2020-04-17

## 2020-04-17 RX ORDER — DEXTROSE MONOHYDRATE, SODIUM CHLORIDE, AND POTASSIUM CHLORIDE 50; .745; 4.5 G/1000ML; G/1000ML; G/1000ML
1000 INJECTION, SOLUTION INTRAVENOUS
Refills: 0 | Status: DISCONTINUED | OUTPATIENT
Start: 2020-04-17 | End: 2020-04-18

## 2020-04-17 RX ORDER — POTASSIUM PHOSPHATE, MONOBASIC POTASSIUM PHOSPHATE, DIBASIC 236; 224 MG/ML; MG/ML
15 INJECTION, SOLUTION INTRAVENOUS ONCE
Refills: 0 | Status: COMPLETED | OUTPATIENT
Start: 2020-04-17 | End: 2020-04-17

## 2020-04-17 RX ORDER — IBUPROFEN 200 MG
600 TABLET ORAL EVERY 6 HOURS
Refills: 0 | Status: DISCONTINUED | OUTPATIENT
Start: 2020-04-17 | End: 2020-04-18

## 2020-04-17 RX ORDER — SODIUM CHLORIDE 9 MG/ML
1000 INJECTION, SOLUTION INTRAVENOUS ONCE
Refills: 0 | Status: COMPLETED | OUTPATIENT
Start: 2020-04-17 | End: 2020-04-17

## 2020-04-17 RX ORDER — OXYCODONE HYDROCHLORIDE 5 MG/1
5 TABLET ORAL EVERY 4 HOURS
Refills: 0 | Status: DISCONTINUED | OUTPATIENT
Start: 2020-04-17 | End: 2020-04-17

## 2020-04-17 RX ORDER — HYDROMORPHONE HYDROCHLORIDE 2 MG/ML
2 INJECTION INTRAMUSCULAR; INTRAVENOUS; SUBCUTANEOUS EVERY 4 HOURS
Refills: 0 | Status: DISCONTINUED | OUTPATIENT
Start: 2020-04-17 | End: 2020-04-18

## 2020-04-17 RX ADMIN — POTASSIUM PHOSPHATE, MONOBASIC POTASSIUM PHOSPHATE, DIBASIC 62.5 MILLIMOLE(S): 236; 224 INJECTION, SOLUTION INTRAVENOUS at 13:57

## 2020-04-17 RX ADMIN — Medication 600 MILLIGRAM(S): at 20:51

## 2020-04-17 RX ADMIN — Medication 15 MILLIGRAM(S): at 01:18

## 2020-04-17 RX ADMIN — HYDROMORPHONE HYDROCHLORIDE 2 MILLIGRAM(S): 2 INJECTION INTRAMUSCULAR; INTRAVENOUS; SUBCUTANEOUS at 23:03

## 2020-04-17 RX ADMIN — Medication 650 MILLIGRAM(S): at 17:36

## 2020-04-17 RX ADMIN — ENOXAPARIN SODIUM 40 MILLIGRAM(S): 100 INJECTION SUBCUTANEOUS at 13:57

## 2020-04-17 RX ADMIN — Medication 15 MILLIGRAM(S): at 05:50

## 2020-04-17 RX ADMIN — HYDROMORPHONE HYDROCHLORIDE 2 MILLIGRAM(S): 2 INJECTION INTRAMUSCULAR; INTRAVENOUS; SUBCUTANEOUS at 18:55

## 2020-04-17 RX ADMIN — HYDROMORPHONE HYDROCHLORIDE 2 MILLIGRAM(S): 2 INJECTION INTRAMUSCULAR; INTRAVENOUS; SUBCUTANEOUS at 13:57

## 2020-04-17 RX ADMIN — HYDROMORPHONE HYDROCHLORIDE 1 MILLIGRAM(S): 2 INJECTION INTRAMUSCULAR; INTRAVENOUS; SUBCUTANEOUS at 10:27

## 2020-04-17 RX ADMIN — DEXTROSE MONOHYDRATE, SODIUM CHLORIDE, AND POTASSIUM CHLORIDE 100 MILLILITER(S): 50; .745; 4.5 INJECTION, SOLUTION INTRAVENOUS at 20:03

## 2020-04-17 RX ADMIN — Medication 600 MILLIGRAM(S): at 15:31

## 2020-04-17 RX ADMIN — SODIUM CHLORIDE 1000 MILLILITER(S): 9 INJECTION, SOLUTION INTRAVENOUS at 21:15

## 2020-04-17 RX ADMIN — Medication 380 MILLIGRAM(S): at 02:18

## 2020-04-17 RX ADMIN — Medication 380 MILLIGRAM(S): at 09:53

## 2020-04-18 ENCOUNTER — TRANSCRIPTION ENCOUNTER (OUTPATIENT)
Age: 30
End: 2020-04-18

## 2020-04-18 VITALS
OXYGEN SATURATION: 98 % | HEART RATE: 77 BPM | SYSTOLIC BLOOD PRESSURE: 110 MMHG | RESPIRATION RATE: 18 BRPM | DIASTOLIC BLOOD PRESSURE: 63 MMHG

## 2020-04-18 LAB
ALBUMIN SERPL ELPH-MCNC: 3.9 G/DL — SIGNIFICANT CHANGE UP (ref 3.3–5)
ALP SERPL-CCNC: 108 U/L — SIGNIFICANT CHANGE UP (ref 40–120)
ALT FLD-CCNC: 124 U/L — HIGH (ref 4–33)
ANION GAP SERPL CALC-SCNC: 13 MMO/L — SIGNIFICANT CHANGE UP (ref 7–14)
AST SERPL-CCNC: 51 U/L — HIGH (ref 4–32)
BILIRUB SERPL-MCNC: 0.7 MG/DL — SIGNIFICANT CHANGE UP (ref 0.2–1.2)
BUN SERPL-MCNC: 6 MG/DL — LOW (ref 7–23)
CALCIUM SERPL-MCNC: 9.4 MG/DL — SIGNIFICANT CHANGE UP (ref 8.4–10.5)
CHLORIDE SERPL-SCNC: 103 MMOL/L — SIGNIFICANT CHANGE UP (ref 98–107)
CO2 SERPL-SCNC: 23 MMOL/L — SIGNIFICANT CHANGE UP (ref 22–31)
CREAT SERPL-MCNC: 0.52 MG/DL — SIGNIFICANT CHANGE UP (ref 0.5–1.3)
GLUCOSE SERPL-MCNC: 81 MG/DL — SIGNIFICANT CHANGE UP (ref 70–99)
HCT VFR BLD CALC: 38.5 % — SIGNIFICANT CHANGE UP (ref 34.5–45)
HGB BLD-MCNC: 12.5 G/DL — SIGNIFICANT CHANGE UP (ref 11.5–15.5)
MAGNESIUM SERPL-MCNC: 2 MG/DL — SIGNIFICANT CHANGE UP (ref 1.6–2.6)
MCHC RBC-ENTMCNC: 27.9 PG — SIGNIFICANT CHANGE UP (ref 27–34)
MCHC RBC-ENTMCNC: 32.5 % — SIGNIFICANT CHANGE UP (ref 32–36)
MCV RBC AUTO: 85.9 FL — SIGNIFICANT CHANGE UP (ref 80–100)
NRBC # FLD: 0 K/UL — SIGNIFICANT CHANGE UP (ref 0–0)
PHOSPHATE SERPL-MCNC: 2.6 MG/DL — SIGNIFICANT CHANGE UP (ref 2.5–4.5)
PLATELET # BLD AUTO: 289 K/UL — SIGNIFICANT CHANGE UP (ref 150–400)
PMV BLD: 9.3 FL — SIGNIFICANT CHANGE UP (ref 7–13)
POTASSIUM SERPL-MCNC: 4 MMOL/L — SIGNIFICANT CHANGE UP (ref 3.5–5.3)
POTASSIUM SERPL-SCNC: 4 MMOL/L — SIGNIFICANT CHANGE UP (ref 3.5–5.3)
PROT SERPL-MCNC: 7.2 G/DL — SIGNIFICANT CHANGE UP (ref 6–8.3)
RBC # BLD: 4.48 M/UL — SIGNIFICANT CHANGE UP (ref 3.8–5.2)
RBC # FLD: 12.9 % — SIGNIFICANT CHANGE UP (ref 10.3–14.5)
SODIUM SERPL-SCNC: 139 MMOL/L — SIGNIFICANT CHANGE UP (ref 135–145)
WBC # BLD: 4.83 K/UL — SIGNIFICANT CHANGE UP (ref 3.8–10.5)
WBC # FLD AUTO: 4.83 K/UL — SIGNIFICANT CHANGE UP (ref 3.8–10.5)

## 2020-04-18 RX ORDER — ONDANSETRON 8 MG/1
4 TABLET, FILM COATED ORAL ONCE
Refills: 0 | Status: COMPLETED | OUTPATIENT
Start: 2020-04-18 | End: 2020-04-18

## 2020-04-18 RX ORDER — ACETAMINOPHEN 500 MG
2 TABLET ORAL
Qty: 0 | Refills: 0 | DISCHARGE
Start: 2020-04-18

## 2020-04-18 RX ORDER — POLYETHYLENE GLYCOL 3350 17 G/17G
17 POWDER, FOR SOLUTION ORAL ONCE
Refills: 0 | Status: COMPLETED | OUTPATIENT
Start: 2020-04-18 | End: 2020-04-18

## 2020-04-18 RX ORDER — HYDROMORPHONE HYDROCHLORIDE 2 MG/ML
1 INJECTION INTRAMUSCULAR; INTRAVENOUS; SUBCUTANEOUS
Qty: 12 | Refills: 0
Start: 2020-04-18

## 2020-04-18 RX ORDER — SODIUM,POTASSIUM PHOSPHATES 278-250MG
1 POWDER IN PACKET (EA) ORAL ONCE
Refills: 0 | Status: COMPLETED | OUTPATIENT
Start: 2020-04-18 | End: 2020-04-18

## 2020-04-18 RX ORDER — IBUPROFEN 200 MG
1 TABLET ORAL
Qty: 0 | Refills: 0 | DISCHARGE
Start: 2020-04-18

## 2020-04-18 RX ADMIN — Medication 600 MILLIGRAM(S): at 03:37

## 2020-04-18 RX ADMIN — Medication 600 MILLIGRAM(S): at 16:00

## 2020-04-18 RX ADMIN — HYDROMORPHONE HYDROCHLORIDE 2 MILLIGRAM(S): 2 INJECTION INTRAMUSCULAR; INTRAVENOUS; SUBCUTANEOUS at 19:50

## 2020-04-18 RX ADMIN — HYDROMORPHONE HYDROCHLORIDE 2 MILLIGRAM(S): 2 INJECTION INTRAMUSCULAR; INTRAVENOUS; SUBCUTANEOUS at 13:13

## 2020-04-18 RX ADMIN — Medication 650 MILLIGRAM(S): at 01:23

## 2020-04-18 RX ADMIN — Medication 650 MILLIGRAM(S): at 12:42

## 2020-04-18 RX ADMIN — POLYETHYLENE GLYCOL 3350 17 GRAM(S): 17 POWDER, FOR SOLUTION ORAL at 13:13

## 2020-04-18 RX ADMIN — ONDANSETRON 4 MILLIGRAM(S): 8 TABLET, FILM COATED ORAL at 13:13

## 2020-04-18 RX ADMIN — HYDROMORPHONE HYDROCHLORIDE 2 MILLIGRAM(S): 2 INJECTION INTRAMUSCULAR; INTRAVENOUS; SUBCUTANEOUS at 04:15

## 2020-04-18 RX ADMIN — Medication 650 MILLIGRAM(S): at 18:15

## 2020-04-18 RX ADMIN — ENOXAPARIN SODIUM 40 MILLIGRAM(S): 100 INJECTION SUBCUTANEOUS at 10:21

## 2020-04-18 RX ADMIN — Medication 600 MILLIGRAM(S): at 10:20

## 2020-04-18 RX ADMIN — Medication 1 PACKET(S): at 10:20

## 2020-04-18 NOTE — DISCHARGE NOTE NURSING/CASE MANAGEMENT/SOCIAL WORK - PATIENT PORTAL LINK FT
You can access the FollowMyHealth Patient Portal offered by [facility] by registering at the following website: [email or web address]. By joining [employer]’s FollowMyHealth portal, you will also be able to view your health information using other applications (apps) compatible with our system.

## 2020-04-18 NOTE — PROGRESS NOTE ADULT - ASSESSMENT
28yo F p/w recurrent biliary cholic vs acute cholecystitis. Found to have acute cholecystitis now s/p OR 4/16 with Dr. Mckeon. She is not tolerating much solid food and her pain control is improving. She is otherwise hemodynamically stable and afebrile.      - Pain: PO Tylenol, Motrin, Dilaudid  - Diet: Regular   - f/u CBC, BMP this AM  - DVT Lovenox  - OOB/Ambulate  - Dispo: Home     [facility] Team B Pager: #[phone]

## 2020-04-18 NOTE — DISCHARGE NOTE NURSING/CASE MANAGEMENT/SOCIAL WORK - NSDCFUADDAPPT_GEN_ALL_CORE_FT
Please follow up with Dr. Mckeon 2 weeks after discharge. Please call his office to set up an appointment.    Please take Tylenol 500-650 mg every 6 hours and Ibuprofen 400 mg every 6 hours for pain control. If not controlled by Tylenol and Ibuprofen take Dilaudid 2mg every 4 hours as needed

## 2020-04-20 LAB — SURGICAL PATHOLOGY STUDY: SIGNIFICANT CHANGE UP

## 2020-05-01 NOTE — PROGRESS NOTE ADULT - ASSESSMENT
Physical Exam 
Musculoskeletal:  
     Legs: 
 
 
Room 412: wound assess Seen with Dr. Cecille Lauren. Discussed care with primary nurse Rachid Solorio RN. Will write for topical treatment orders. Will turn over care to nursing staff at this time.  
Eda RAMIREZN, RN, [employer], [address] 
 
 28yo F p/w recurrent biliary cholic vs acute cholecystitis. Found to have acute cholecystitis after OR 4/16 with Dr. Mckeon. She had significant pain after the procedure but now improving and tolerating some clear liquids but not much solid food. She is otherwise hemodynamically stable and afebrile.      - Reg   - f/u CBC, BMP this AM  - s/p Lap Ruth  - DVT Lovenox  - Dispo: May go home today if pain well controlled and tolerating regular diet    [facility] Team B Pager: #[phone]
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
## 2020-09-13 ENCOUNTER — EMERGENCY (EMERGENCY)
Facility: HOSPITAL | Age: 30
LOS: 0 days | Discharge: HOME | End: 2020-09-13
Payer: COMMERCIAL

## 2020-09-13 VITALS
HEART RATE: 90 BPM | HEIGHT: 61 IN | SYSTOLIC BLOOD PRESSURE: 103 MMHG | RESPIRATION RATE: 18 BRPM | WEIGHT: 130.95 LBS | TEMPERATURE: 98 F | OXYGEN SATURATION: 97 % | DIASTOLIC BLOOD PRESSURE: 70 MMHG

## 2020-09-13 DIAGNOSIS — M54.5 LOW BACK PAIN: ICD-10-CM

## 2020-09-13 DIAGNOSIS — M54.2 CERVICALGIA: ICD-10-CM

## 2020-09-13 DIAGNOSIS — R20.0 ANESTHESIA OF SKIN: ICD-10-CM

## 2020-09-13 DIAGNOSIS — G54.2 CERVICAL ROOT DISORDERS, NOT ELSEWHERE CLASSIFIED: ICD-10-CM

## 2020-09-13 DIAGNOSIS — M54.17 RADICULOPATHY, LUMBOSACRAL REGION: ICD-10-CM

## 2020-09-13 DIAGNOSIS — Z86.73 PERSONAL HISTORY OF TRANSIENT ISCHEMIC ATTACK (TIA), AND CEREBRAL INFARCTION WITHOUT RESIDUAL DEFICITS: ICD-10-CM

## 2020-09-13 DIAGNOSIS — K81.9 CHOLECYSTITIS, UNSPECIFIED: Chronic | ICD-10-CM

## 2020-09-13 LAB
APPEARANCE UR: CLEAR — SIGNIFICANT CHANGE UP
BACTERIA # UR AUTO: ABNORMAL
BILIRUB UR-MCNC: NEGATIVE — SIGNIFICANT CHANGE UP
COLOR SPEC: YELLOW — SIGNIFICANT CHANGE UP
DIFF PNL FLD: ABNORMAL
EPI CELLS # UR: ABNORMAL
GLUCOSE UR QL: NEGATIVE MG/DL — SIGNIFICANT CHANGE UP
HCG UR QL: NEGATIVE — SIGNIFICANT CHANGE UP
KETONES UR-MCNC: NEGATIVE — SIGNIFICANT CHANGE UP
LEUKOCYTE ESTERASE UR-ACNC: NEGATIVE — SIGNIFICANT CHANGE UP
NITRITE UR-MCNC: NEGATIVE — SIGNIFICANT CHANGE UP
PH UR: 7 — SIGNIFICANT CHANGE UP (ref 5–8)
PROT UR-MCNC: NEGATIVE MG/DL — SIGNIFICANT CHANGE UP
SP GR SPEC: 1.01 — SIGNIFICANT CHANGE UP (ref 1.01–1.02)
UROBILINOGEN FLD QL: NEGATIVE MG/DL — SIGNIFICANT CHANGE UP
WBC UR QL: ABNORMAL

## 2020-09-13 PROCEDURE — 99284 EMERGENCY DEPT VISIT MOD MDM: CPT

## 2020-09-13 RX ORDER — IBUPROFEN 200 MG
1 TABLET ORAL
Qty: 28 | Refills: 0
Start: 2020-09-13 | End: 2020-09-19

## 2020-09-13 RX ORDER — KETOROLAC TROMETHAMINE 30 MG/ML
60 SYRINGE (ML) INJECTION ONCE
Refills: 0 | Status: DISCONTINUED | OUTPATIENT
Start: 2020-09-13 | End: 2020-09-13

## 2020-09-13 RX ORDER — CYCLOBENZAPRINE HYDROCHLORIDE 10 MG/1
1 TABLET, FILM COATED ORAL
Qty: 30 | Refills: 0
Start: 2020-09-13 | End: 2020-09-22

## 2020-09-13 RX ADMIN — Medication 60 MILLIGRAM(S): at 22:15
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
## 2020-09-13 NOTE — ED ADULT NURSE NOTE - OBJECTIVE STATEMENT
Patient presents in ED complaining of neck pain which radiates down the lower back , numbness and tingling to lower extremities. Pain 10/10
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
## 2020-09-13 NOTE — ED PROVIDER NOTE - PATIENT PORTAL LINK FT
You can access the FollowMyHealth Patient Portal offered by [facility] by registering at the following website: [email or web address]. By joining [email or web address]’s FollowMyHealth portal, you will also be able to view your health information using other applications (apps) compatible with our system.

## 2020-09-13 NOTE — ED PROVIDER NOTE - CARE PLAN
Principal Discharge DX:	Neuropathy, cervical  Secondary Diagnosis:	Neuropathy, lumbosacral (radicular)

## 2020-09-13 NOTE — ED ADULT NURSE REASSESSMENT NOTE - NS ED NURSE REASSESS COMMENT FT1
During interview patient verbalized feeling suicidal and wanting to hurt herself 2 weeks ago. Patient currently denies suicidal ideations, and has not plan or intent to hurt herself. She reports that she lives alone. Patient given written information and urge to call suicide hotline if she feels this way again, or to return to ED, Written information given for counsellors and clinics in her area. Patient asked if she wanted to speak with psychiatry, patient declined, reporting that she felt safe to return home.
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
## 2021-02-12 ENCOUNTER — INPATIENT (INPATIENT)
Facility: HOSPITAL | Age: 31
LOS: 0 days | Discharge: LEFT BEFORE DISCHARGE | DRG: 552 | End: 2021-02-13
Attending: SURGERY | Admitting: SURGERY
Payer: SELF-PAY

## 2021-02-12 VITALS
SYSTOLIC BLOOD PRESSURE: 104 MMHG | RESPIRATION RATE: 16 BRPM | WEIGHT: 130.07 LBS | OXYGEN SATURATION: 100 % | HEIGHT: 61 IN | HEART RATE: 90 BPM | DIASTOLIC BLOOD PRESSURE: 89 MMHG

## 2021-02-12 DIAGNOSIS — K81.9 CHOLECYSTITIS, UNSPECIFIED: Chronic | ICD-10-CM

## 2021-02-12 PROCEDURE — 99284 EMERGENCY DEPT VISIT MOD MDM: CPT

## 2021-02-12 RX ORDER — ACETAMINOPHEN 500 MG
975 TABLET ORAL ONCE
Refills: 0 | Status: COMPLETED | OUTPATIENT
Start: 2021-02-12 | End: 2021-02-12

## 2021-02-12 RX ADMIN — Medication 975 MILLIGRAM(S): at 23:59
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
## 2021-02-12 NOTE — ED PROVIDER NOTE - PATIENT PORTAL LINK FT
You can access the FollowMyHealth Patient Portal offered by [facility] by registering at the following website: [email or web address]. By joining [email or web address] FollowMyHealth portal, you will also be able to view your health information using other applications (apps) compatible with our system.
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
## 2021-02-12 NOTE — ED PROVIDER NOTE - NS ED MD DISPO DISCHARGE CCDA
Keep patient on strict bed rest   Consult PT/OT for evaluation  Consult orthopedic surgery to be fitted for back brace  Continue pain control with Dilaudid  Subcutaneous heparin 5000 units q 12 hours for DVT prophylaxis  Patient/Caregiver provided printed discharge information.
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
## 2021-02-13 VITALS
SYSTOLIC BLOOD PRESSURE: 93 MMHG | TEMPERATURE: 98 F | HEART RATE: 72 BPM | DIASTOLIC BLOOD PRESSURE: 59 MMHG | OXYGEN SATURATION: 98 % | RESPIRATION RATE: 18 BRPM

## 2021-02-13 DIAGNOSIS — S39.92XA UNSPECIFIED INJURY OF LOWER BACK, INITIAL ENCOUNTER: ICD-10-CM

## 2021-02-13 PROBLEM — S02.91XA UNSPECIFIED FRACTURE OF SKULL, INITIAL ENCOUNTER FOR CLOSED FRACTURE: Chronic | Status: ACTIVE | Noted: 2017-04-12

## 2021-02-13 LAB
ALBUMIN SERPL ELPH-MCNC: 3.9 G/DL — SIGNIFICANT CHANGE UP (ref 3.3–5)
ALP SERPL-CCNC: 94 U/L — SIGNIFICANT CHANGE UP (ref 40–120)
ALT FLD-CCNC: 18 U/L — SIGNIFICANT CHANGE UP (ref 10–45)
ANION GAP SERPL CALC-SCNC: 9 MMOL/L — SIGNIFICANT CHANGE UP (ref 5–17)
APTT BLD: 32.3 SEC — SIGNIFICANT CHANGE UP (ref 27.5–35.5)
AST SERPL-CCNC: 22 U/L — SIGNIFICANT CHANGE UP (ref 10–40)
BASE EXCESS BLDV CALC-SCNC: 2.8 MMOL/L — HIGH (ref -2–2)
BASOPHILS # BLD AUTO: 0.01 K/UL — SIGNIFICANT CHANGE UP (ref 0–0.2)
BASOPHILS NFR BLD AUTO: 0.1 % — SIGNIFICANT CHANGE UP (ref 0–2)
BILIRUB SERPL-MCNC: 0.4 MG/DL — SIGNIFICANT CHANGE UP (ref 0.2–1.2)
BUN SERPL-MCNC: 13 MG/DL — SIGNIFICANT CHANGE UP (ref 7–23)
CA-I SERPL-SCNC: 1.2 MMOL/L — SIGNIFICANT CHANGE UP (ref 1.12–1.3)
CALCIUM SERPL-MCNC: 9.3 MG/DL — SIGNIFICANT CHANGE UP (ref 8.4–10.5)
CHLORIDE BLDV-SCNC: 106 MMOL/L — SIGNIFICANT CHANGE UP (ref 96–108)
CHLORIDE SERPL-SCNC: 102 MMOL/L — SIGNIFICANT CHANGE UP (ref 96–108)
CO2 BLDV-SCNC: 30 MMOL/L — SIGNIFICANT CHANGE UP (ref 22–30)
CO2 SERPL-SCNC: 26 MMOL/L — SIGNIFICANT CHANGE UP (ref 22–31)
CREAT SERPL-MCNC: 0.53 MG/DL — SIGNIFICANT CHANGE UP (ref 0.5–1.3)
EOSINOPHIL # BLD AUTO: 0.06 K/UL — SIGNIFICANT CHANGE UP (ref 0–0.5)
EOSINOPHIL NFR BLD AUTO: 0.6 % — SIGNIFICANT CHANGE UP (ref 0–6)
GAS PNL BLDV: 136 MMOL/L — SIGNIFICANT CHANGE UP (ref 135–145)
GAS PNL BLDV: SIGNIFICANT CHANGE UP
GAS PNL BLDV: SIGNIFICANT CHANGE UP
GLUCOSE BLDV-MCNC: 82 MG/DL — SIGNIFICANT CHANGE UP (ref 70–99)
GLUCOSE SERPL-MCNC: 85 MG/DL — SIGNIFICANT CHANGE UP (ref 70–99)
HCG SERPL-ACNC: <2 MIU/ML — SIGNIFICANT CHANGE UP
HCO3 BLDV-SCNC: 28 MMOL/L — SIGNIFICANT CHANGE UP (ref 21–29)
HCT VFR BLD CALC: 38.3 % — SIGNIFICANT CHANGE UP (ref 34.5–45)
HCT VFR BLD CALC: 39.3 % — SIGNIFICANT CHANGE UP (ref 34.5–45)
HCT VFR BLDA CALC: 42 % — SIGNIFICANT CHANGE UP (ref 39–50)
HGB BLD CALC-MCNC: 13.6 G/DL — SIGNIFICANT CHANGE UP (ref 11.5–15.5)
HGB BLD-MCNC: 12.7 G/DL — SIGNIFICANT CHANGE UP (ref 11.5–15.5)
HGB BLD-MCNC: 12.9 G/DL — SIGNIFICANT CHANGE UP (ref 11.5–15.5)
IMM GRANULOCYTES NFR BLD AUTO: 0.4 % — SIGNIFICANT CHANGE UP (ref 0–1.5)
INR BLD: 0.99 RATIO — SIGNIFICANT CHANGE UP (ref 0.88–1.16)
LACTATE BLDV-MCNC: 0.9 MMOL/L — SIGNIFICANT CHANGE UP (ref 0.7–2)
LIDOCAIN IGE QN: 28 U/L — SIGNIFICANT CHANGE UP (ref 7–60)
LYMPHOCYTES # BLD AUTO: 2.6 K/UL — SIGNIFICANT CHANGE UP (ref 1–3.3)
LYMPHOCYTES # BLD AUTO: 27.3 % — SIGNIFICANT CHANGE UP (ref 13–44)
MCHC RBC-ENTMCNC: 29 PG — SIGNIFICANT CHANGE UP (ref 27–34)
MCHC RBC-ENTMCNC: 33.2 GM/DL — SIGNIFICANT CHANGE UP (ref 32–36)
MCV RBC AUTO: 87.4 FL — SIGNIFICANT CHANGE UP (ref 80–100)
MONOCYTES # BLD AUTO: 0.45 K/UL — SIGNIFICANT CHANGE UP (ref 0–0.9)
MONOCYTES NFR BLD AUTO: 4.7 % — SIGNIFICANT CHANGE UP (ref 2–14)
NEUTROPHILS # BLD AUTO: 6.35 K/UL — SIGNIFICANT CHANGE UP (ref 1.8–7.4)
NEUTROPHILS NFR BLD AUTO: 66.9 % — SIGNIFICANT CHANGE UP (ref 43–77)
NRBC # BLD: 0 /100 WBCS — SIGNIFICANT CHANGE UP (ref 0–0)
OTHER CELLS CSF MANUAL: 12 ML/DL — LOW (ref 18–22)
PCO2 BLDV: 49 MMHG — SIGNIFICANT CHANGE UP (ref 35–50)
PH BLDV: 7.38 — SIGNIFICANT CHANGE UP (ref 7.35–7.45)
PLATELET # BLD AUTO: 297 K/UL — SIGNIFICANT CHANGE UP (ref 150–400)
PO2 BLDV: 36 MMHG — SIGNIFICANT CHANGE UP (ref 25–45)
POTASSIUM BLDV-SCNC: 3.5 MMOL/L — SIGNIFICANT CHANGE UP (ref 3.5–5.3)
POTASSIUM SERPL-MCNC: 3.7 MMOL/L — SIGNIFICANT CHANGE UP (ref 3.5–5.3)
POTASSIUM SERPL-SCNC: 3.7 MMOL/L — SIGNIFICANT CHANGE UP (ref 3.5–5.3)
PROT SERPL-MCNC: 6.7 G/DL — SIGNIFICANT CHANGE UP (ref 6–8.3)
PROTHROM AB SERPL-ACNC: 11.9 SEC — SIGNIFICANT CHANGE UP (ref 10.6–13.6)
RBC # BLD: 4.38 M/UL — SIGNIFICANT CHANGE UP (ref 3.8–5.2)
RBC # FLD: 12.3 % — SIGNIFICANT CHANGE UP (ref 10.3–14.5)
SAO2 % BLDV: 62 % — LOW (ref 67–88)
SARS-COV-2 RNA SPEC QL NAA+PROBE: SIGNIFICANT CHANGE UP
SODIUM SERPL-SCNC: 137 MMOL/L — SIGNIFICANT CHANGE UP (ref 135–145)
WBC # BLD: 9.51 K/UL — SIGNIFICANT CHANGE UP (ref 3.8–10.5)
WBC # FLD AUTO: 9.51 K/UL — SIGNIFICANT CHANGE UP (ref 3.8–10.5)

## 2021-02-13 PROCEDURE — 84132 ASSAY OF SERUM POTASSIUM: CPT

## 2021-02-13 PROCEDURE — 85025 COMPLETE CBC W/AUTO DIFF WBC: CPT

## 2021-02-13 PROCEDURE — 83605 ASSAY OF LACTIC ACID: CPT

## 2021-02-13 PROCEDURE — 84702 CHORIONIC GONADOTROPIN TEST: CPT

## 2021-02-13 PROCEDURE — 73030 X-RAY EXAM OF SHOULDER: CPT

## 2021-02-13 PROCEDURE — 85018 HEMOGLOBIN: CPT

## 2021-02-13 PROCEDURE — 72125 CT NECK SPINE W/O DYE: CPT

## 2021-02-13 PROCEDURE — 73562 X-RAY EXAM OF KNEE 3: CPT

## 2021-02-13 PROCEDURE — 70450 CT HEAD/BRAIN W/O DYE: CPT | Mod: 26,MA

## 2021-02-13 PROCEDURE — 72125 CT NECK SPINE W/O DYE: CPT | Mod: 26,MA

## 2021-02-13 PROCEDURE — 82803 BLOOD GASES ANY COMBINATION: CPT

## 2021-02-13 PROCEDURE — 74177 CT ABD & PELVIS W/CONTRAST: CPT

## 2021-02-13 PROCEDURE — 72131 CT LUMBAR SPINE W/O DYE: CPT | Mod: 26,MA

## 2021-02-13 PROCEDURE — 74177 CT ABD & PELVIS W/CONTRAST: CPT | Mod: 26,MA

## 2021-02-13 PROCEDURE — U0003: CPT

## 2021-02-13 PROCEDURE — 85610 PROTHROMBIN TIME: CPT

## 2021-02-13 PROCEDURE — 73030 X-RAY EXAM OF SHOULDER: CPT | Mod: 26,RT

## 2021-02-13 PROCEDURE — 83690 ASSAY OF LIPASE: CPT

## 2021-02-13 PROCEDURE — 72128 CT CHEST SPINE W/O DYE: CPT | Mod: 26,MA

## 2021-02-13 PROCEDURE — 71260 CT THORAX DX C+: CPT

## 2021-02-13 PROCEDURE — 73110 X-RAY EXAM OF WRIST: CPT | Mod: 26,LT

## 2021-02-13 PROCEDURE — 80053 COMPREHEN METABOLIC PANEL: CPT

## 2021-02-13 PROCEDURE — 71260 CT THORAX DX C+: CPT | Mod: 26,MA

## 2021-02-13 PROCEDURE — 85730 THROMBOPLASTIN TIME PARTIAL: CPT

## 2021-02-13 PROCEDURE — U0005: CPT

## 2021-02-13 PROCEDURE — 99285 EMERGENCY DEPT VISIT HI MDM: CPT | Mod: 25

## 2021-02-13 PROCEDURE — 82435 ASSAY OF BLOOD CHLORIDE: CPT

## 2021-02-13 PROCEDURE — 82330 ASSAY OF CALCIUM: CPT

## 2021-02-13 PROCEDURE — 70450 CT HEAD/BRAIN W/O DYE: CPT

## 2021-02-13 PROCEDURE — 85014 HEMATOCRIT: CPT

## 2021-02-13 PROCEDURE — 73110 X-RAY EXAM OF WRIST: CPT

## 2021-02-13 PROCEDURE — 73562 X-RAY EXAM OF KNEE 3: CPT | Mod: 26,LT

## 2021-02-13 PROCEDURE — 82947 ASSAY GLUCOSE BLOOD QUANT: CPT

## 2021-02-13 PROCEDURE — 96374 THER/PROPH/DIAG INJ IV PUSH: CPT

## 2021-02-13 PROCEDURE — 84295 ASSAY OF SERUM SODIUM: CPT

## 2021-02-13 RX ORDER — SODIUM CHLORIDE 9 MG/ML
1000 INJECTION INTRAMUSCULAR; INTRAVENOUS; SUBCUTANEOUS ONCE
Refills: 0 | Status: COMPLETED | OUTPATIENT
Start: 2021-02-13 | End: 2021-02-13

## 2021-02-13 RX ORDER — ACETAMINOPHEN 500 MG
975 TABLET ORAL EVERY 6 HOURS
Refills: 0 | Status: DISCONTINUED | OUTPATIENT
Start: 2021-02-13 | End: 2021-02-13

## 2021-02-13 RX ORDER — METHOCARBAMOL 500 MG/1
1500 TABLET, FILM COATED ORAL ONCE
Refills: 0 | Status: COMPLETED | OUTPATIENT
Start: 2021-02-13 | End: 2021-02-13

## 2021-02-13 RX ORDER — METHOCARBAMOL 500 MG/1
2 TABLET, FILM COATED ORAL
Qty: 30 | Refills: 0
Start: 2021-02-13 | End: 2021-02-17

## 2021-02-13 RX ORDER — KETOROLAC TROMETHAMINE 30 MG/ML
30 SYRINGE (ML) INJECTION ONCE
Refills: 0 | Status: DISCONTINUED | OUTPATIENT
Start: 2021-02-13 | End: 2021-02-13

## 2021-02-13 RX ADMIN — METHOCARBAMOL 1500 MILLIGRAM(S): 500 TABLET, FILM COATED ORAL at 01:00

## 2021-02-13 RX ADMIN — SODIUM CHLORIDE 1000 MILLILITER(S): 9 INJECTION INTRAMUSCULAR; INTRAVENOUS; SUBCUTANEOUS at 03:15

## 2021-02-13 RX ADMIN — Medication 30 MILLIGRAM(S): at 04:57

## 2021-02-13 RX ADMIN — SODIUM CHLORIDE 1000 MILLILITER(S): 9 INJECTION INTRAMUSCULAR; INTRAVENOUS; SUBCUTANEOUS at 00:13
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
## 2021-02-13 NOTE — CHART NOTE - NSCHARTNOTEFT_GEN_A_CORE
Spoke to patient 3 times this afternoon about patient leaving AMA.  Patient does not want to stay another night because she has no medical insurance and has 2 kids at home to take care of to wait for MRI imaging. Call MRI and the soonest they could get her in is tomorrow AM. Patient reports that if she stays another night she will have to pay "$5000 rather than $500 for an outpatient MRI she scheduled herself." Spoke to patient about the risks of her leaving with spine tenderness and neurological symptoms (tingling, numbness of her arms,legs). Informed patient that she may become paralyzed and/or die if she left the hospital without obtaining the MRI spine and that it would be out of the hospital's control to help her. After extensive discussion, patient reported understanding the risks of leaving but emphasized that she still had to leave. Asked patient to speak with the hospital  if she was concerned about costs.  informed us that patient had medical insurance. There was no medical insurance recorded in patient's chart. Explained to patient that she was leaving against medical advice and that she would have to sign the paperwork that the hospital will not be responsible. Patient consented and signed [place] paperwork.     Denise Gardner PGY-1  Trauma [id number]
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
## 2021-02-13 NOTE — H&P ADULT - NSHPPHYSICALEXAM_GEN_ALL_CORE
VITAL SIGNS:  Vital Signs Last 24 Hrs  T(C): 36.7 (13 Feb 2021 03:32), Max: 37 (13 Feb 2021 00:44)  T(F): 98 (13 Feb 2021 03:32), Max: 98.6 (13 Feb 2021 00:44)  HR: 70 (13 Feb 2021 04:55) (70 - 90)  BP: 93/65 (13 Feb 2021 04:55) (89/61 - 104/89)  BP(mean): --  RR: 16 (13 Feb 2021 04:55) (16 - 18)  SpO2: 100% (13 Feb 2021 04:55) (100% - 100%)      PHYSICAL EXAM:    General: NAD  HEENT: Normocephalic, atraumatic, EOMI, PEERLA  Neck: Soft, midline trachea, posterior cervical spine tenderness  Chest: No chest wall tenderness  Cardiac: S1, S2, RRR  Respiratory: Bilateral breath sounds clear and equal   Abdomen: Soft, non-distended, lower abdominal tenderness to deep palpation; no rebound or guarding; no palpable masses   Groin: Normal appearing  Extremities: Palpable radial & DP pulses bilaterally. Motor grossly intact in all 4 extremities. Right shoulder and left wrist pain. Right upper extremity and bilateral lower extremity tingling/numbness.  Back: lower thoracic and lumbar spine tenderness; no palpable runoff/stepoff/deformity
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
## 2021-02-17 NOTE — ED POST DISCHARGE NOTE - REASON FOR FOLLOW-UP
Other Chart Review for Quality Purposes, noted patient left AMA after admission. Reviewed charting, noted patient left after financial concerns regarding MRI.  Called to check on patient's status out of concern for patient's well being.

## 2021-02-17 NOTE — ED POST DISCHARGE NOTE - RESULT SUMMARY
No answer 2/15.  Called again today, 2/17 at 2:45pm. Patient states she is having tingling of hands and legs, trouble standing for long periods of time, trouble concentrating and focusing on computer screen.  Explained to patient that while some of her symptoms are concussion in nature, others are very concerning for possible cervical spine/cord injury and she needs an MRI asap. Explained pt can apply for emergency Medicaid and we have financial counselors to help and d/w our ECTOR Zhao who will help pt if she returns.  Will attempt to secure childcare and return to ER.  Gave pt my direct spectralink.

## 2021-02-19 ENCOUNTER — EMERGENCY (EMERGENCY)
Facility: HOSPITAL | Age: 31
LOS: 1 days | Discharge: HOME | End: 2021-02-19
Attending: EMERGENCY MEDICINE
Payer: COMMERCIAL

## 2021-02-19 VITALS
HEART RATE: 80 BPM | OXYGEN SATURATION: 99 % | WEIGHT: 134.92 LBS | TEMPERATURE: 98 F | SYSTOLIC BLOOD PRESSURE: 103 MMHG | DIASTOLIC BLOOD PRESSURE: 72 MMHG | HEIGHT: 61 IN | RESPIRATION RATE: 16 BRPM

## 2021-02-19 VITALS
DIASTOLIC BLOOD PRESSURE: 67 MMHG | HEART RATE: 82 BPM | SYSTOLIC BLOOD PRESSURE: 95 MMHG | OXYGEN SATURATION: 100 % | TEMPERATURE: 99 F | RESPIRATION RATE: 17 BRPM

## 2021-02-19 DIAGNOSIS — K81.9 CHOLECYSTITIS, UNSPECIFIED: Chronic | ICD-10-CM

## 2021-02-19 LAB
ALBUMIN SERPL ELPH-MCNC: 4 G/DL — SIGNIFICANT CHANGE UP (ref 3.3–5)
ALP SERPL-CCNC: 90 U/L — SIGNIFICANT CHANGE UP (ref 40–120)
ALT FLD-CCNC: 18 U/L — SIGNIFICANT CHANGE UP (ref 10–45)
ANION GAP SERPL CALC-SCNC: 10 MMOL/L — SIGNIFICANT CHANGE UP (ref 5–17)
AST SERPL-CCNC: 22 U/L — SIGNIFICANT CHANGE UP (ref 10–40)
BASOPHILS # BLD AUTO: 0.02 K/UL — SIGNIFICANT CHANGE UP (ref 0–0.2)
BASOPHILS NFR BLD AUTO: 0.2 % — SIGNIFICANT CHANGE UP (ref 0–2)
BILIRUB SERPL-MCNC: 0.5 MG/DL — SIGNIFICANT CHANGE UP (ref 0.2–1.2)
BLD GP AB SCN SERPL QL: NEGATIVE — SIGNIFICANT CHANGE UP
BUN SERPL-MCNC: 16 MG/DL — SIGNIFICANT CHANGE UP (ref 7–23)
CALCIUM SERPL-MCNC: 9.3 MG/DL — SIGNIFICANT CHANGE UP (ref 8.4–10.5)
CHLORIDE SERPL-SCNC: 103 MMOL/L — SIGNIFICANT CHANGE UP (ref 96–108)
CO2 SERPL-SCNC: 24 MMOL/L — SIGNIFICANT CHANGE UP (ref 22–31)
CREAT SERPL-MCNC: 0.52 MG/DL — SIGNIFICANT CHANGE UP (ref 0.5–1.3)
EOSINOPHIL # BLD AUTO: 0.04 K/UL — SIGNIFICANT CHANGE UP (ref 0–0.5)
EOSINOPHIL NFR BLD AUTO: 0.5 % — SIGNIFICANT CHANGE UP (ref 0–6)
GLUCOSE SERPL-MCNC: 88 MG/DL — SIGNIFICANT CHANGE UP (ref 70–99)
HCG SERPL-ACNC: <2 MIU/ML — SIGNIFICANT CHANGE UP
HCT VFR BLD CALC: 40.2 % — SIGNIFICANT CHANGE UP (ref 34.5–45)
HGB BLD-MCNC: 13.4 G/DL — SIGNIFICANT CHANGE UP (ref 11.5–15.5)
IMM GRANULOCYTES NFR BLD AUTO: 0.4 % — SIGNIFICANT CHANGE UP (ref 0–1.5)
LYMPHOCYTES # BLD AUTO: 1.71 K/UL — SIGNIFICANT CHANGE UP (ref 1–3.3)
LYMPHOCYTES # BLD AUTO: 20.7 % — SIGNIFICANT CHANGE UP (ref 13–44)
MCHC RBC-ENTMCNC: 29.6 PG — SIGNIFICANT CHANGE UP (ref 27–34)
MCHC RBC-ENTMCNC: 33.3 GM/DL — SIGNIFICANT CHANGE UP (ref 32–36)
MCV RBC AUTO: 88.9 FL — SIGNIFICANT CHANGE UP (ref 80–100)
MONOCYTES # BLD AUTO: 0.55 K/UL — SIGNIFICANT CHANGE UP (ref 0–0.9)
MONOCYTES NFR BLD AUTO: 6.6 % — SIGNIFICANT CHANGE UP (ref 2–14)
NEUTROPHILS # BLD AUTO: 5.93 K/UL — SIGNIFICANT CHANGE UP (ref 1.8–7.4)
NEUTROPHILS NFR BLD AUTO: 71.6 % — SIGNIFICANT CHANGE UP (ref 43–77)
NRBC # BLD: 0 /100 WBCS — SIGNIFICANT CHANGE UP (ref 0–0)
PLATELET # BLD AUTO: 277 K/UL — SIGNIFICANT CHANGE UP (ref 150–400)
POTASSIUM SERPL-MCNC: 4 MMOL/L — SIGNIFICANT CHANGE UP (ref 3.5–5.3)
POTASSIUM SERPL-SCNC: 4 MMOL/L — SIGNIFICANT CHANGE UP (ref 3.5–5.3)
PROT SERPL-MCNC: 7 G/DL — SIGNIFICANT CHANGE UP (ref 6–8.3)
RBC # BLD: 4.52 M/UL — SIGNIFICANT CHANGE UP (ref 3.8–5.2)
RBC # FLD: 12.7 % — SIGNIFICANT CHANGE UP (ref 10.3–14.5)
RH IG SCN BLD-IMP: POSITIVE — SIGNIFICANT CHANGE UP
SARS-COV-2 RNA SPEC QL NAA+PROBE: SIGNIFICANT CHANGE UP
SODIUM SERPL-SCNC: 137 MMOL/L — SIGNIFICANT CHANGE UP (ref 135–145)
WBC # BLD: 8.28 K/UL — SIGNIFICANT CHANGE UP (ref 3.8–10.5)
WBC # FLD AUTO: 8.28 K/UL — SIGNIFICANT CHANGE UP (ref 3.8–10.5)

## 2021-02-19 PROCEDURE — 99285 EMERGENCY DEPT VISIT HI MDM: CPT

## 2021-02-19 PROCEDURE — 99284 EMERGENCY DEPT VISIT MOD MDM: CPT | Mod: 25

## 2021-02-19 PROCEDURE — U0003: CPT

## 2021-02-19 PROCEDURE — 96374 THER/PROPH/DIAG INJ IV PUSH: CPT

## 2021-02-19 PROCEDURE — 72148 MRI LUMBAR SPINE W/O DYE: CPT | Mod: 26

## 2021-02-19 PROCEDURE — 86900 BLOOD TYPING SEROLOGIC ABO: CPT

## 2021-02-19 PROCEDURE — 86850 RBC ANTIBODY SCREEN: CPT

## 2021-02-19 PROCEDURE — 76498 UNLISTED MR PROCEDURE: CPT

## 2021-02-19 PROCEDURE — 85025 COMPLETE CBC W/AUTO DIFF WBC: CPT

## 2021-02-19 PROCEDURE — 80053 COMPREHEN METABOLIC PANEL: CPT

## 2021-02-19 PROCEDURE — 93005 ELECTROCARDIOGRAM TRACING: CPT

## 2021-02-19 PROCEDURE — 84702 CHORIONIC GONADOTROPIN TEST: CPT

## 2021-02-19 PROCEDURE — 86901 BLOOD TYPING SEROLOGIC RH(D): CPT

## 2021-02-19 PROCEDURE — U0005: CPT

## 2021-02-19 PROCEDURE — 93010 ELECTROCARDIOGRAM REPORT: CPT

## 2021-02-19 PROCEDURE — 72141 MRI NECK SPINE W/O DYE: CPT | Mod: 26

## 2021-02-19 RX ORDER — ONDANSETRON 8 MG/1
4 TABLET, FILM COATED ORAL ONCE
Refills: 0 | Status: COMPLETED | OUTPATIENT
Start: 2021-02-19 | End: 2021-02-19

## 2021-02-19 RX ORDER — ACETAMINOPHEN 500 MG
650 TABLET ORAL ONCE
Refills: 0 | Status: COMPLETED | OUTPATIENT
Start: 2021-02-19 | End: 2021-02-19

## 2021-02-19 RX ADMIN — Medication 1 MILLIGRAM(S): at 17:59

## 2021-02-19 RX ADMIN — Medication 650 MILLIGRAM(S): at 17:39

## 2021-02-19 RX ADMIN — ONDANSETRON 4 MILLIGRAM(S): 8 TABLET, FILM COATED ORAL at 17:39

## 2021-02-19 NOTE — ED PROVIDER NOTE - PHYSICAL EXAMINATION
Gen: A&Ox4.   HEENT: Atraumatic. No pharyngeal erythema or exudates. Mucous membranes moist, no scleral icterus. No signs of head trauma. No hemotympanum, no ecchymosis or tenderness on palpation of cranial and facial bones.  CV: RRR. No M/R/G. No significant LE edema. Distal pulses intact. Capillary refill < 2 seconds.  Resp: Normal effort. CTAB, no rales, rhonchi, or wheezes.  GI: Abdomen non tender to palpation, soft and non-distended. No masses appreciated. + BS.  MSK: No open wounds. No ecchymosis appreciated. Focal tenderness at C6, otherwise spine non tender in midline throughout. No step off or deformity.  Neuro: Following commands. Moving extremities spontaneously. Neuro: CN2-12 grossly intact. Motor strength +5/5 throughout upper and lower extremities. Sensation intact throughout upper and lower extremities. No tremor. Finger to nose smooth and coordinated, heal to shin smooth and coordinated. No pronator drift.   Psych: Appropriate mood, cooperative

## 2021-02-19 NOTE — ED PROVIDER NOTE - ATTENDING CONTRIBUTION TO CARE
Attending MD Vega:  I personally have seen and examined this patient.  Resident note reviewed and agree on plan of care and except where noted.  See HPI, PE, and MDM for details.
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
## 2021-02-19 NOTE — ED ADULT NURSE NOTE - PRO INTERPRETER NEED 2
"Requested Prescriptions   Pending Prescriptions Disp Refills     simvastatin (ZOCOR) 40 MG tablet [Pharmacy Med Name: SIMVASTATIN 40MG TABLETS]  Last Written Prescription Date:  7/24/2018  Last Fill Quantity: 30 tablet,  # refills: 0   Last office visit: 8/6/2018 with prescribing provider:  Lucille   Future Office Visit:       30 tablet 0     Sig: TAKE 1 TABLET(40 MG) BY MOUTH AT BEDTIME    Statins Protocol Passed    9/5/2018  3:02 PM       Passed - LDL on file in past 12 months    Recent Labs   Lab Test  08/06/18   1029   LDL  122*            Passed - No abnormal creatine kinase in past 12 months    No lab results found.            Passed - Recent (12 mo) or future (30 days) visit within the authorizing provider's specialty    Patient had office visit in the last 12 months or has a visit in the next 30 days with authorizing provider or within the authorizing provider's specialty.  See \"Patient Info\" tab in inbasket, or \"Choose Columns\" in Meds & Orders section of the refill encounter.           Passed - Patient is age 18 or older       Passed - No active pregnancy on record       Passed - No positive pregnancy test in past 12 months          " English

## 2021-02-19 NOTE — ED PROVIDER NOTE - PATIENT PORTAL LINK FT
You can access the FollowMyHealth Patient Portal offered by [facility] by registering at the following website: [email or web address]. By joining [facility]’s FollowMyHealth portal, you will also be able to view your health information using other applications (apps) compatible with our system.
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
## 2021-02-19 NOTE — ED PROVIDER NOTE - NS ED ROS FT
Gen: Denies fever.   HEENT: Denies headache. Denies congestion.  CV: Denies chest pain. Denies lightheadedness.  Skin: Denies rash.   Resp: Denies SOB. Denies cough.  GI: Denies abd pain. +nausea. Denies vomiting. Denies diarrhea. Denies melena. Denies hematochezia.  Msk: Denies extremity swelling.   : Denies dysuria. Denies hematuria.  Neuro: Denies LOC. Denies dizziness. +new numbness/tingling. +new focal weakness.  Psych: Denies SI

## 2021-02-19 NOTE — ED PROVIDER NOTE - NSFOLLOWUPCLINICS_GEN_ALL_ED_FT
[facility]  Neurology  [address] [place] [address] [address] [place] [address]  Phone: [phone]  Fax:   Follow Up Time:
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
## 2021-02-19 NOTE — ED PROVIDER NOTE - PROGRESS NOTE DETAILS
PGY 1 Moisés Arguello: Pt in collar. CDU to evaluate. MR elmer. PGY 1 Moisés Arguello: Imaging completed and no acute findings. Discussed results with pt. She states that she is feeling well and is agreeable to returning home with follow up. She says that she would like to speak with a . Will contact social work to speak with pt prior to d/c. PGY 1 Moisés Arguello: Imaging completed and no acute findings. Discussed results with pt. She states that she is feeling well and is agreeable to returning home with follow up. Discussed plan for neurology follow up, and pt given return precautions and she understands. Pt says that she would like to speak with a  prior to  leaving. Will contact social work to speak with pt prior to d/c.
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
## 2021-03-08 ENCOUNTER — EMERGENCY (EMERGENCY)
Facility: HOSPITAL | Age: 31
LOS: 1 days | Discharge: HOME | End: 2021-03-08
Attending: EMERGENCY MEDICINE
Payer: MEDICAID

## 2021-03-08 VITALS
TEMPERATURE: 98 F | SYSTOLIC BLOOD PRESSURE: 105 MMHG | HEART RATE: 93 BPM | RESPIRATION RATE: 18 BRPM | OXYGEN SATURATION: 97 % | DIASTOLIC BLOOD PRESSURE: 72 MMHG

## 2021-03-08 VITALS
HEIGHT: 61 IN | RESPIRATION RATE: 18 BRPM | WEIGHT: 119.93 LBS | SYSTOLIC BLOOD PRESSURE: 121 MMHG | OXYGEN SATURATION: 98 % | HEART RATE: 101 BPM | TEMPERATURE: 98 F | DIASTOLIC BLOOD PRESSURE: 87 MMHG

## 2021-03-08 DIAGNOSIS — K81.9 CHOLECYSTITIS, UNSPECIFIED: Chronic | ICD-10-CM

## 2021-03-08 LAB
ALBUMIN SERPL ELPH-MCNC: 4.3 G/DL — SIGNIFICANT CHANGE UP (ref 3.3–5)
ALP SERPL-CCNC: 88 U/L — SIGNIFICANT CHANGE UP (ref 40–120)
ALT FLD-CCNC: 21 U/L — SIGNIFICANT CHANGE UP (ref 10–45)
ANION GAP SERPL CALC-SCNC: 13 MMOL/L — SIGNIFICANT CHANGE UP (ref 5–17)
APAP SERPL-MCNC: <15 UG/ML — SIGNIFICANT CHANGE UP (ref 10–30)
APPEARANCE UR: CLEAR — SIGNIFICANT CHANGE UP
AST SERPL-CCNC: 24 U/L — SIGNIFICANT CHANGE UP (ref 10–40)
BASOPHILS # BLD AUTO: 0.02 K/UL — SIGNIFICANT CHANGE UP (ref 0–0.2)
BASOPHILS NFR BLD AUTO: 0.3 % — SIGNIFICANT CHANGE UP (ref 0–2)
BILIRUB DIRECT SERPL-MCNC: 0.1 MG/DL — SIGNIFICANT CHANGE UP (ref 0–0.2)
BILIRUB INDIRECT FLD-MCNC: 0.7 MG/DL — SIGNIFICANT CHANGE UP (ref 0.2–1)
BILIRUB SERPL-MCNC: 0.8 MG/DL — SIGNIFICANT CHANGE UP (ref 0.2–1.2)
BILIRUB UR-MCNC: NEGATIVE — SIGNIFICANT CHANGE UP
BUN SERPL-MCNC: 10 MG/DL — SIGNIFICANT CHANGE UP (ref 7–23)
CALCIUM SERPL-MCNC: 10.1 MG/DL — SIGNIFICANT CHANGE UP (ref 8.4–10.5)
CHLORIDE SERPL-SCNC: 102 MMOL/L — SIGNIFICANT CHANGE UP (ref 96–108)
CO2 SERPL-SCNC: 25 MMOL/L — SIGNIFICANT CHANGE UP (ref 22–31)
COLOR SPEC: YELLOW — SIGNIFICANT CHANGE UP
CREAT SERPL-MCNC: 0.59 MG/DL — SIGNIFICANT CHANGE UP (ref 0.5–1.3)
DIFF PNL FLD: ABNORMAL
EOSINOPHIL # BLD AUTO: 0.06 K/UL — SIGNIFICANT CHANGE UP (ref 0–0.5)
EOSINOPHIL NFR BLD AUTO: 1 % — SIGNIFICANT CHANGE UP (ref 0–6)
ETHANOL SERPL-MCNC: SIGNIFICANT CHANGE UP MG/DL (ref 0–10)
GLUCOSE SERPL-MCNC: 89 MG/DL — SIGNIFICANT CHANGE UP (ref 70–99)
GLUCOSE UR QL: NEGATIVE — SIGNIFICANT CHANGE UP
HCG UR QL: NEGATIVE — SIGNIFICANT CHANGE UP
HCT VFR BLD CALC: 41.7 % — SIGNIFICANT CHANGE UP (ref 34.5–45)
HGB BLD-MCNC: 13.6 G/DL — SIGNIFICANT CHANGE UP (ref 11.5–15.5)
IMM GRANULOCYTES NFR BLD AUTO: 0.2 % — SIGNIFICANT CHANGE UP (ref 0–1.5)
KETONES UR-MCNC: SIGNIFICANT CHANGE UP
LEUKOCYTE ESTERASE UR-ACNC: ABNORMAL
LYMPHOCYTES # BLD AUTO: 1.79 K/UL — SIGNIFICANT CHANGE UP (ref 1–3.3)
LYMPHOCYTES # BLD AUTO: 29.1 % — SIGNIFICANT CHANGE UP (ref 13–44)
MCHC RBC-ENTMCNC: 28.9 PG — SIGNIFICANT CHANGE UP (ref 27–34)
MCHC RBC-ENTMCNC: 32.6 GM/DL — SIGNIFICANT CHANGE UP (ref 32–36)
MCV RBC AUTO: 88.5 FL — SIGNIFICANT CHANGE UP (ref 80–100)
MONOCYTES # BLD AUTO: 0.4 K/UL — SIGNIFICANT CHANGE UP (ref 0–0.9)
MONOCYTES NFR BLD AUTO: 6.5 % — SIGNIFICANT CHANGE UP (ref 2–14)
NEUTROPHILS # BLD AUTO: 3.88 K/UL — SIGNIFICANT CHANGE UP (ref 1.8–7.4)
NEUTROPHILS NFR BLD AUTO: 62.9 % — SIGNIFICANT CHANGE UP (ref 43–77)
NITRITE UR-MCNC: NEGATIVE — SIGNIFICANT CHANGE UP
NRBC # BLD: 0 /100 WBCS — SIGNIFICANT CHANGE UP (ref 0–0)
PCP SPEC-MCNC: SIGNIFICANT CHANGE UP
PH UR: 6 — SIGNIFICANT CHANGE UP (ref 5–8)
PLATELET # BLD AUTO: 263 K/UL — SIGNIFICANT CHANGE UP (ref 150–400)
POTASSIUM SERPL-MCNC: 3.9 MMOL/L — SIGNIFICANT CHANGE UP (ref 3.5–5.3)
POTASSIUM SERPL-SCNC: 3.9 MMOL/L — SIGNIFICANT CHANGE UP (ref 3.5–5.3)
PROT SERPL-MCNC: 7.8 G/DL — SIGNIFICANT CHANGE UP (ref 6–8.3)
PROT UR-MCNC: ABNORMAL
RBC # BLD: 4.71 M/UL — SIGNIFICANT CHANGE UP (ref 3.8–5.2)
RBC # FLD: 12.4 % — SIGNIFICANT CHANGE UP (ref 10.3–14.5)
SALICYLATES SERPL-MCNC: <2 MG/DL — LOW (ref 15–30)
SODIUM SERPL-SCNC: 140 MMOL/L — SIGNIFICANT CHANGE UP (ref 135–145)
SP GR SPEC: 1.03 — HIGH (ref 1.01–1.02)
UROBILINOGEN FLD QL: NEGATIVE — SIGNIFICANT CHANGE UP
WBC # BLD: 6.16 K/UL — SIGNIFICANT CHANGE UP (ref 3.8–10.5)
WBC # FLD AUTO: 6.16 K/UL — SIGNIFICANT CHANGE UP (ref 3.8–10.5)

## 2021-03-08 PROCEDURE — 90792 PSYCH DIAG EVAL W/MED SRVCS: CPT | Mod: 95

## 2021-03-08 PROCEDURE — 93005 ELECTROCARDIOGRAM TRACING: CPT

## 2021-03-08 PROCEDURE — 80307 DRUG TEST PRSMV CHEM ANLYZR: CPT

## 2021-03-08 PROCEDURE — 85025 COMPLETE CBC W/AUTO DIFF WBC: CPT

## 2021-03-08 PROCEDURE — 86769 SARS-COV-2 COVID-19 ANTIBODY: CPT

## 2021-03-08 PROCEDURE — 99285 EMERGENCY DEPT VISIT HI MDM: CPT

## 2021-03-08 PROCEDURE — 81025 URINE PREGNANCY TEST: CPT

## 2021-03-08 PROCEDURE — 81001 URINALYSIS AUTO W/SCOPE: CPT

## 2021-03-08 PROCEDURE — 80076 HEPATIC FUNCTION PANEL: CPT

## 2021-03-08 PROCEDURE — 80048 BASIC METABOLIC PNL TOTAL CA: CPT

## 2021-03-08 PROCEDURE — 93010 ELECTROCARDIOGRAM REPORT: CPT

## 2021-03-08 PROCEDURE — U0005: CPT

## 2021-03-08 PROCEDURE — 84443 ASSAY THYROID STIM HORMONE: CPT

## 2021-03-08 PROCEDURE — 99285 EMERGENCY DEPT VISIT HI MDM: CPT | Mod: 25

## 2021-03-08 PROCEDURE — U0003: CPT
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
## 2021-03-08 NOTE — ED BEHAVIORAL HEALTH ASSESSMENT NOTE - RISK ASSESSMENT
patient is over all low risk--forward thinking, coping appropriately with recent stressors, has some family support, is help-seeking. Low Acute Suicide Risk
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
## 2021-03-08 NOTE — ED PROVIDER NOTE - PATIENT PORTAL LINK FT
You can access the FollowMyHealth Patient Portal offered by [facility] by registering at the following website: [email or web address]. By joining [employer]s FollowMyHealth portal, you will also be able to view your health information using other applications (apps) compatible with our system.
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
## 2021-03-08 NOTE — ED PROVIDER NOTE - NSFOLLOWUPINSTRUCTIONS_ED_ALL_ED_FT
Please see a psychiatrist at [facility] for short-term psychiatric services and making a connection to long-term care, the hours are m-f 9am-3pm and the phone # is [phone]. The [facility] is located on the first floor of the [facility], within the campus of [facility]. The main entrance to our building is at the corner of [address] in [place], [place]. You can also access our campus through the hospital entrance at [phone]    Headache    A headache is pain or discomfort felt around the head or neck area. The specific cause of a headache may not be found as there are many types including tension headaches, migraine headaches, and cluster headaches. Watch your condition for any changes. Things you can do to manage your pain include taking over the counter and prescription medications as instructed by your health care provider, lying down in a dark quiet room, limiting stress, getting regular sleep, and refraining from alcohol and tobacco products.    SEEK IMMEDIATE MEDICAL CARE IF YOU HAVE ANY OF THE FOLLOWING SYMPTOMS: fever, vomiting, stiff neck, loss of vision, problems with speech, muscle weakness, loss of balance, trouble walking, passing out, or confusion.    1. TAKE ALL MEDICATIONS AS DIRECTED.    2. FOR PAIN OR FEVER YOU CAN TAKE IBUPROFEN (MOTRIN, ADVIL) OR ACETAMINOPHEN (TYLENOL) AS NEEDED, AS DIRECTED ON PACKAGING.  3. FOLLOW UP WITH YOUR PRIMARY DOCTOR WITHIN 5 DAYS AS DIRECTED.  4. IF YOU HAD LABS OR IMAGING DONE, YOU WERE GIVEN COPIES OF ALL LABS AND/OR IMAGING RESULTS FROM YOUR ER VISIT--PLEASE TAKE THEM WITH YOU TO YOUR FOLLOW UP APPOINTMENTS.  5. RETURN TO THE ER FOR ANY WORSENING SYMPTOMS OR CONCERNS. Please see a psychiatrist at [facility] for short-term psychiatric services and making a connection to long-term care, the hours are m-f 9am-3pm and the phone # is [phone]. The [facility] is located on the first floor of the [facility], within the campus of [facility]. The main entrance to our building is at the corner of [address] in [place], [place]. You can also access our campus through the hospital entrance at [phone]    Headache    A headache is pain or discomfort felt around the head or neck area. The specific cause of a headache may not be found as there are many types including tension headaches, migraine headaches, and cluster headaches. Watch your condition for any changes. Things you can do to manage your pain include taking over the counter and prescription medications as instructed by your health care provider, lying down in a dark quiet room, limiting stress, getting regular sleep, and refraining from alcohol and tobacco products.    SEEK IMMEDIATE MEDICAL CARE IF YOU HAVE ANY OF THE FOLLOWING SYMPTOMS: fever, vomiting, stiff neck, loss of vision, problems with speech, muscle weakness, loss of balance, trouble walking, passing out, or confusion.    1. TAKE ALL MEDICATIONS AS DIRECTED.    2. FOR PAIN OR FEVER YOU CAN TAKE IBUPROFEN (MOTRIN, ADVIL) OR ACETAMINOPHEN (TYLENOL) AS NEEDED, AS DIRECTED ON PACKAGING.  3. FOLLOW UP WITH YOUR PRIMARY DOCTOR WITHIN 5 DAYS AS DIRECTED.  4. IF YOU HAD LABS OR IMAGING DONE, YOU WERE GIVEN COPIES OF ALL LABS AND/OR IMAGING RESULTS FROM YOUR ER VISIT--PLEASE TAKE THEM WITH YOU TO YOUR FOLLOW UP APPOINTMENTS.  5. RETURN TO THE ER FOR ANY WORSENING SYMPTOMS OR CONCERNS.      Concussion    WHAT YOU NEED TO KNOW:    A concussion is a mild brain injury. It is usually caused by a bump or blow to the head from a fall, a motor vehicle crash, or a sports injury. Sometimes being shaken forcefully may cause a concussion.    DISCHARGE INSTRUCTIONS:    Have someone call 911 for any of the following:   •You cannot be woken.      •You have a seizure, increasing confusion, or a change in personality.      •Your speech becomes slurred, or you have new vision problems.      Seek care immediately if:   •You have sudden and new vision problems.      •You have a severe headache that does not go away.      •You have arm or leg weakness, numbness, or new problems with coordination.      •You have blood or clear fluid coming out of the ears or nose.      Contact your healthcare provider if:   •You have nausea or are vomiting.      •You feel more sleepy than usual.      •Your symptoms get worse.      •Your symptoms last longer than 6 weeks after the injury.      •You have questions or concerns about your condition or care.      Medicines: You may need any of the following:   •Acetaminophen decreases pain and fever. It is available without a doctor's order. Ask how much to take and how often to take it. Follow directions. Read the labels of all other medicines you are using to see if they also contain acetaminophen, or ask your doctor or pharmacist. Acetaminophen can cause liver damage if not taken correctly. Do not use more than 4 grams (4,000 milligrams) total of acetaminophen in one day.       •NSAIDs help decrease swelling and pain or fever. This medicine is available with or without a doctor's order. NSAIDs can cause stomach bleeding or kidney problems in certain people. If you take blood thinner medicine, always ask your healthcare provider if NSAIDs are safe for you. Always read the medicine label and follow directions.      •Take your medicine as directed. Contact your healthcare provider if you think your medicine is not helping or if you have side effects. Tell him or her if you are allergic to any medicine. Keep a list of the medicines, vitamins, and herbs you take. Include the amounts, and when and why you take them. Bring the list or the pill bottles to follow-up visits. Carry your medicine list with you in case of an emergency.      Self-care: Concussion symptoms usually go away within about 10 days, but they may last longer. The following may be recommended to manage your symptoms:   •Rest from physical and mental activities as directed. Mental activities are those that require thinking, concentration, and attention. You will need to rest until your symptoms are gone. Rest will allow you to recover from your concussion. Ask your healthcare provider when you can return to work and other daily activities.      •Have someone stay with you for the first 24 hours after your injury. Your healthcare provider should be contacted if your symptoms get worse, or you develop new symptoms.      •Do not participate in sports and physical activities until your healthcare provider says it is okay. They could make your symptoms worse or lead to another concussion. Your healthcare provider will tell you when it is okay for you to return to sports or physical activities. Ask for more information about sports concussions.      Prevent another concussion:   •Wear protective sports equipment that fits properly. Helmets help decrease your risk for a serious brain injury. Talk to your healthcare provider about ways that can decrease your risk for a concussion if you play sports.      •Wear your seatbelt every time you travel. This helps to decrease your risk for a head injury if you are in a car accident.       Follow up with your healthcare provider as directed: Write down your questions so you remember to ask them during your visits.

## 2021-03-08 NOTE — ED PROVIDER NOTE - NEUROLOGICAL, MLM
No facial asymmetry; Moving all extremities equally and with normal strength, normal gait, FTN intact.
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
## 2021-03-08 NOTE — ED PROVIDER NOTE - PROGRESS NOTE DETAILS
ED Sign Out, increased stress/depression, medically cleared, awaiting Psych evaluation for disposition decision -- Robson Huston MD pettet- psych cleared patient for dc, discussed with patient. Patient feels well, tolerating PO. Discussed lab and radiology findings with patient. Patient feels comfortable going home. Gave home care and follow up instructions. Discussed which symptoms to look out for and when to return to the ED for further evaluation. Patient given opportunity to ask questions about their medical condition and had all questions answered.
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
## 2021-03-08 NOTE — ED BEHAVIORAL HEALTH ASSESSMENT NOTE - NS ED BHA TELEPSYCH PATIENT LOCATION
Progress Notes by Carito Ward at 09/20/17 01:46 PM     Author:  Carito Ward Service:  (none) Author Type:  Medical Records Staff     Filed:  09/20/17 01:46 PM Encounter Date:  9/18/2017 Status:  Signed     :  Carito Ward (Medical Records Staff)            I AGREE[JN1.1M]      Revision History        User Key Date/Time User Provider Type Action    > JN1.1 09/20/17 01:46 PM Carito Ward Medical Records Staff Sign    M - Manual            
[facility]
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
## 2021-03-09 DIAGNOSIS — F43.29 ADJUSTMENT DISORDER WITH OTHER SYMPTOMS: ICD-10-CM

## 2021-03-09 LAB
SARS-COV-2 IGG SERPL QL IA: POSITIVE
SARS-COV-2 IGM SERPL IA-ACNC: 10.7 INDEX — HIGH
SARS-COV-2 RNA SPEC QL NAA+PROBE: SIGNIFICANT CHANGE UP
TSH SERPL-MCNC: 1.58 UIU/ML — SIGNIFICANT CHANGE UP (ref 0.27–4.2)
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
## 2021-08-12 NOTE — ED PROVIDER NOTE - NS ED MD EM SELECTION
She has had this for some time   Did it get acutely worse? Is she slurring speech? Confused?    Bilateral? [id number] Comprehensive

## 2021-08-18 NOTE — ED PROVIDER NOTE - TRANSFER CONDITION
Patient transferred from ICU to  [id number]. Dentures in place, bilateral hearing aides present. Patient oriented to room, call light in reach and vitals completed. Bed alarm activated. Satisfactory

## 2021-08-29 ENCOUNTER — TRANSCRIPTION ENCOUNTER (OUTPATIENT)
Age: 31
End: 2021-08-29

## 2022-01-24 ENCOUNTER — EMERGENCY (EMERGENCY)
Facility: HOSPITAL | Age: 32
LOS: 1 days | Discharge: HOME | End: 2022-01-24
Attending: STUDENT IN AN ORGANIZED HEALTH CARE EDUCATION/TRAINING PROGRAM | Admitting: EMERGENCY MEDICINE
Payer: MEDICAID

## 2022-01-24 VITALS
HEIGHT: 61 IN | RESPIRATION RATE: 18 BRPM | OXYGEN SATURATION: 100 % | TEMPERATURE: 98 F | DIASTOLIC BLOOD PRESSURE: 63 MMHG | SYSTOLIC BLOOD PRESSURE: 100 MMHG | HEART RATE: 69 BPM

## 2022-01-24 DIAGNOSIS — K81.9 CHOLECYSTITIS, UNSPECIFIED: Chronic | ICD-10-CM

## 2022-01-24 LAB
ALBUMIN SERPL ELPH-MCNC: 4.4 G/DL — SIGNIFICANT CHANGE UP (ref 3.3–5)
ALP SERPL-CCNC: 86 U/L — SIGNIFICANT CHANGE UP (ref 40–120)
ALT FLD-CCNC: 22 U/L — SIGNIFICANT CHANGE UP (ref 4–33)
ANION GAP SERPL CALC-SCNC: 11 MMOL/L — SIGNIFICANT CHANGE UP (ref 7–14)
AST SERPL-CCNC: 25 U/L — SIGNIFICANT CHANGE UP (ref 4–32)
BASE EXCESS BLDV CALC-SCNC: 0.8 MMOL/L — SIGNIFICANT CHANGE UP (ref -2–3)
BASOPHILS # BLD AUTO: 0.02 K/UL — SIGNIFICANT CHANGE UP (ref 0–0.2)
BASOPHILS NFR BLD AUTO: 0.2 % — SIGNIFICANT CHANGE UP (ref 0–2)
BILIRUB SERPL-MCNC: 0.2 MG/DL — SIGNIFICANT CHANGE UP (ref 0.2–1.2)
BLOOD GAS VENOUS COMPREHENSIVE RESULT: SIGNIFICANT CHANGE UP
BUN SERPL-MCNC: 14 MG/DL — SIGNIFICANT CHANGE UP (ref 7–23)
CALCIUM SERPL-MCNC: 9.2 MG/DL — SIGNIFICANT CHANGE UP (ref 8.4–10.5)
CHLORIDE BLDV-SCNC: 103 MMOL/L — SIGNIFICANT CHANGE UP (ref 96–108)
CHLORIDE SERPL-SCNC: 103 MMOL/L — SIGNIFICANT CHANGE UP (ref 98–107)
CO2 BLDV-SCNC: 28.6 MMOL/L — HIGH (ref 22–26)
CO2 SERPL-SCNC: 24 MMOL/L — SIGNIFICANT CHANGE UP (ref 22–31)
CREAT SERPL-MCNC: 0.6 MG/DL — SIGNIFICANT CHANGE UP (ref 0.5–1.3)
EOSINOPHIL # BLD AUTO: 0.09 K/UL — SIGNIFICANT CHANGE UP (ref 0–0.5)
EOSINOPHIL NFR BLD AUTO: 1.1 % — SIGNIFICANT CHANGE UP (ref 0–6)
FLUAV AG NPH QL: SIGNIFICANT CHANGE UP
FLUBV AG NPH QL: SIGNIFICANT CHANGE UP
GAS PNL BLDV: 136 MMOL/L — SIGNIFICANT CHANGE UP (ref 136–145)
GLUCOSE BLDV-MCNC: 78 MG/DL — SIGNIFICANT CHANGE UP (ref 70–99)
GLUCOSE SERPL-MCNC: 80 MG/DL — SIGNIFICANT CHANGE UP (ref 70–99)
HCG SERPL-ACNC: <5 MIU/ML — SIGNIFICANT CHANGE UP
HCG UR QL: NEGATIVE — SIGNIFICANT CHANGE UP
HCO3 BLDV-SCNC: 27 MMOL/L — SIGNIFICANT CHANGE UP (ref 22–29)
HCT VFR BLD CALC: 39.9 % — SIGNIFICANT CHANGE UP (ref 34.5–45)
HCT VFR BLDA CALC: 38 % — SIGNIFICANT CHANGE UP (ref 34.5–46.5)
HGB BLD CALC-MCNC: 12.7 G/DL — SIGNIFICANT CHANGE UP (ref 11.5–15.5)
HGB BLD-MCNC: 12.7 G/DL — SIGNIFICANT CHANGE UP (ref 11.5–15.5)
IANC: 5.51 K/UL — SIGNIFICANT CHANGE UP (ref 1.5–8.5)
IMM GRANULOCYTES NFR BLD AUTO: 0.4 % — SIGNIFICANT CHANGE UP (ref 0–1.5)
LACTATE BLDV-MCNC: 1 MMOL/L — SIGNIFICANT CHANGE UP (ref 0.5–2)
LIDOCAIN IGE QN: 39 U/L — SIGNIFICANT CHANGE UP (ref 7–60)
LYMPHOCYTES # BLD AUTO: 2.34 K/UL — SIGNIFICANT CHANGE UP (ref 1–3.3)
LYMPHOCYTES # BLD AUTO: 28 % — SIGNIFICANT CHANGE UP (ref 13–44)
MCHC RBC-ENTMCNC: 28.2 PG — SIGNIFICANT CHANGE UP (ref 27–34)
MCHC RBC-ENTMCNC: 31.8 GM/DL — LOW (ref 32–36)
MCV RBC AUTO: 88.7 FL — SIGNIFICANT CHANGE UP (ref 80–100)
MONOCYTES # BLD AUTO: 0.38 K/UL — SIGNIFICANT CHANGE UP (ref 0–0.9)
MONOCYTES NFR BLD AUTO: 4.5 % — SIGNIFICANT CHANGE UP (ref 2–14)
NEUTROPHILS # BLD AUTO: 5.51 K/UL — SIGNIFICANT CHANGE UP (ref 1.8–7.4)
NEUTROPHILS NFR BLD AUTO: 65.8 % — SIGNIFICANT CHANGE UP (ref 43–77)
NRBC # BLD: 0 /100 WBCS — SIGNIFICANT CHANGE UP
NRBC # FLD: 0 K/UL — SIGNIFICANT CHANGE UP
PCO2 BLDV: 49 MMHG — HIGH (ref 39–42)
PH BLDV: 7.35 — SIGNIFICANT CHANGE UP (ref 7.32–7.43)
PLATELET # BLD AUTO: 333 K/UL — SIGNIFICANT CHANGE UP (ref 150–400)
PO2 BLDV: 26 MMHG — SIGNIFICANT CHANGE UP
POTASSIUM BLDV-SCNC: 3.7 MMOL/L — SIGNIFICANT CHANGE UP (ref 3.5–5.1)
POTASSIUM SERPL-MCNC: 4.1 MMOL/L — SIGNIFICANT CHANGE UP (ref 3.5–5.3)
POTASSIUM SERPL-SCNC: 4.1 MMOL/L — SIGNIFICANT CHANGE UP (ref 3.5–5.3)
PROT SERPL-MCNC: 7.5 G/DL — SIGNIFICANT CHANGE UP (ref 6–8.3)
RBC # BLD: 4.5 M/UL — SIGNIFICANT CHANGE UP (ref 3.8–5.2)
RBC # FLD: 12.6 % — SIGNIFICANT CHANGE UP (ref 10.3–14.5)
RSV RNA NPH QL NAA+NON-PROBE: SIGNIFICANT CHANGE UP
SAO2 % BLDV: 45.9 % — SIGNIFICANT CHANGE UP
SARS-COV-2 RNA SPEC QL NAA+PROBE: SIGNIFICANT CHANGE UP
SODIUM SERPL-SCNC: 138 MMOL/L — SIGNIFICANT CHANGE UP (ref 135–145)
WBC # BLD: 8.37 K/UL — SIGNIFICANT CHANGE UP (ref 3.8–10.5)
WBC # FLD AUTO: 8.37 K/UL — SIGNIFICANT CHANGE UP (ref 3.8–10.5)

## 2022-01-24 PROCEDURE — 76830 TRANSVAGINAL US NON-OB: CPT | Mod: 26

## 2022-01-24 PROCEDURE — 74177 CT ABD & PELVIS W/CONTRAST: CPT | Mod: 26,MA

## 2022-01-24 PROCEDURE — 99285 EMERGENCY DEPT VISIT HI MDM: CPT

## 2022-01-24 RX ORDER — ONDANSETRON 8 MG/1
4 TABLET, FILM COATED ORAL ONCE
Refills: 0 | Status: COMPLETED | OUTPATIENT
Start: 2022-01-24 | End: 2022-01-24

## 2022-01-24 RX ORDER — SODIUM CHLORIDE 9 MG/ML
1000 INJECTION INTRAMUSCULAR; INTRAVENOUS; SUBCUTANEOUS ONCE
Refills: 0 | Status: COMPLETED | OUTPATIENT
Start: 2022-01-24 | End: 2022-01-24

## 2022-01-24 RX ORDER — FENTANYL CITRATE 50 UG/ML
25 INJECTION INTRAVENOUS ONCE
Refills: 0 | Status: DISCONTINUED | OUTPATIENT
Start: 2022-01-24 | End: 2022-01-24

## 2022-01-24 RX ORDER — KETOROLAC TROMETHAMINE 30 MG/ML
15 SYRINGE (ML) INJECTION ONCE
Refills: 0 | Status: DISCONTINUED | OUTPATIENT
Start: 2022-01-24 | End: 2022-01-24

## 2022-01-24 RX ORDER — MORPHINE SULFATE 50 MG/1
4 CAPSULE, EXTENDED RELEASE ORAL ONCE
Refills: 0 | Status: DISCONTINUED | OUTPATIENT
Start: 2022-01-24 | End: 2022-01-24

## 2022-01-24 RX ADMIN — ONDANSETRON 4 MILLIGRAM(S): 8 TABLET, FILM COATED ORAL at 20:01

## 2022-01-24 RX ADMIN — Medication 15 MILLIGRAM(S): at 20:01

## 2022-01-24 RX ADMIN — SODIUM CHLORIDE 1000 MILLILITER(S): 9 INJECTION INTRAMUSCULAR; INTRAVENOUS; SUBCUTANEOUS at 19:29

## 2022-01-24 RX ADMIN — FENTANYL CITRATE 25 MICROGRAM(S): 50 INJECTION INTRAVENOUS at 23:46

## 2022-01-24 RX ADMIN — SODIUM CHLORIDE 1000 MILLILITER(S): 9 INJECTION INTRAMUSCULAR; INTRAVENOUS; SUBCUTANEOUS at 23:18

## 2022-01-24 NOTE — ED PROVIDER NOTE - CLINICAL SUMMARY MEDICAL DECISION MAKING FREE TEXT BOX
31F w intermittent right flank pain, appears unconformable, s/p kenneth, pelvic exam wnl, likely stone vs appi vs pyelo, will obtain ct, tvus as well, labs, reassess, rash not sloughing, non tender, non generalized, no hives, will monitor for know, could be ringworm vs autoimmune, no petechia

## 2022-01-24 NOTE — ED PROVIDER NOTE - OBJECTIVE STATEMENT
31F w h/o cholecystectomy p/w right flank pain radiating to the rlq, r groin. States she has had the pain intermittently for months. States since this morning it has been constant. Denies dysuria, vaginal bleeding, discharge, fevers, chills, sob, chest pain. Denies obstipation or constipation. States she feels nauseated but denies vomiting. Denies h/o renal stones. Of note patient reports rash to the left forearm that is itchy. States she had a similar rash around the right eye that resolved after using steroid cream

## 2022-01-24 NOTE — ED PROVIDER NOTE - NS ED ROS FT
Constitutional:  (-) fever, (-) chills, (-) lethargy  ENMT: (-) nasal discharge, (-) sore throat, (-) neck pain or stiffness  Cardiac: (-) chest pain (-) palpitations  Respiratory:  (-) cough (-) SOB  GI:  (+) nausea (-) vomiting (-) diarrhea (+) abdominal pain, + R flank pain  :  (-) dysuria (-) frequency (-) burning, (-) vaginal bleeding  MS:  (-) back pain (-) joint pain  Neuro:  (-) headache (-) numbness (-) tingling (-) focal weakness  Skin:  (+) rash

## 2022-01-24 NOTE — ED ADULT TRIAGE NOTE - CHIEF COMPLAINT QUOTE
pt with co right flank pain radiates into RLQ area and down leg. pt states mild discomfort with urination. pt took motrin 800mg at 2pm mild relef.

## 2022-01-24 NOTE — ED ADULT NURSE NOTE - OBJECTIVE STATEMENT
pt A&ox4, came to ED for right flank pain and abdominal pain in RUQ and RLQ. pt states pain is 10 out of 10. abdominal appearance is soft, flat, and tender in RUQ and RLQ, pt denies Chest pain and SOB. pt denies H/A, Dizziness, lightheadedness, and radiating chest pain. breathing is spontaneous and unlabored. sating 99% on RA. bilateral pedal and radial pulses palpable and strong. Bed in lowest position, call bell within reach, all other safety and comfort measures provided. awaiting labs results and further orders.

## 2022-01-24 NOTE — ED PROVIDER NOTE - PROGRESS NOTE DETAILS
Xavier: hypotension noted, patient states that is her baseline, systolic 80s-90s, no lightheadedness, persistent pain, negative ct and sono, placed in cdu for monitoring and serial abdominal exams

## 2022-01-24 NOTE — ED ADULT NURSE REASSESSMENT NOTE - NS ED NURSE REASSESS COMMENT FT1
Patient is maintaining baseline. Patient is a&ox4 ambulatory at baseline. Patient denies chest pain, sob, headache, n/v. Dr Xavier at bedside with assessment
Patient denies chest pain, sob, n/v, post catscan, received medication as ordered.

## 2022-01-24 NOTE — ED PROVIDER NOTE - PHYSICAL EXAMINATION
CONSTITUTIONAL: NAD, awake, alert  HEAD: Normocephalic; atraumatic  ENMT: External appears normal, MMM  NECK: no tenderness, FROM  CARD: Normal Sl, S2; no audible murmurs  RESP: normal wob, lungs ctab  ABD: soft, non-distended; R CVA, RLQ Tenderness  : PHILIP Jorge at bedside, no dmt, no adnexal tenderness, no discharge   MSK: no edema, normal ROM in all four extremities  SKIN: Warm, dry, 2 cm circular blanching mildly raised area of circumferential erythema, non tender, no sloughing   NEURO: aaox3, moving all extremities spontaneously

## 2022-01-25 LAB
APPEARANCE UR: ABNORMAL
APPEARANCE UR: CLEAR — SIGNIFICANT CHANGE UP
BACTERIA # UR AUTO: ABNORMAL
BILIRUB UR-MCNC: NEGATIVE — SIGNIFICANT CHANGE UP
BILIRUB UR-MCNC: NEGATIVE — SIGNIFICANT CHANGE UP
COLOR SPEC: SIGNIFICANT CHANGE UP
COLOR SPEC: SIGNIFICANT CHANGE UP
CULTURE RESULTS: SIGNIFICANT CHANGE UP
D DIMER BLD IA.RAPID-MCNC: 178 NG/ML DDU — SIGNIFICANT CHANGE UP
DIFF PNL FLD: ABNORMAL
DIFF PNL FLD: NEGATIVE — SIGNIFICANT CHANGE UP
EPI CELLS # UR: SIGNIFICANT CHANGE UP /HPF (ref 0–5)
GLUCOSE UR QL: NEGATIVE — SIGNIFICANT CHANGE UP
GLUCOSE UR QL: NEGATIVE — SIGNIFICANT CHANGE UP
KETONES UR-MCNC: NEGATIVE — SIGNIFICANT CHANGE UP
KETONES UR-MCNC: NEGATIVE — SIGNIFICANT CHANGE UP
LEUKOCYTE ESTERASE UR-ACNC: ABNORMAL
LEUKOCYTE ESTERASE UR-ACNC: NEGATIVE — SIGNIFICANT CHANGE UP
NITRITE UR-MCNC: NEGATIVE — SIGNIFICANT CHANGE UP
NITRITE UR-MCNC: NEGATIVE — SIGNIFICANT CHANGE UP
PH UR: 6 — SIGNIFICANT CHANGE UP (ref 5–8)
PH UR: 7 — SIGNIFICANT CHANGE UP (ref 5–8)
PROT UR-MCNC: ABNORMAL
PROT UR-MCNC: NEGATIVE — SIGNIFICANT CHANGE UP
RBC CASTS # UR COMP ASSIST: SIGNIFICANT CHANGE UP /HPF (ref 0–4)
SP GR SPEC: 1.01 — SIGNIFICANT CHANGE UP (ref 1–1.05)
SP GR SPEC: 1.02 — SIGNIFICANT CHANGE UP (ref 1–1.05)
SPECIMEN SOURCE: SIGNIFICANT CHANGE UP
UROBILINOGEN FLD QL: SIGNIFICANT CHANGE UP
UROBILINOGEN FLD QL: SIGNIFICANT CHANGE UP
WBC UR QL: SIGNIFICANT CHANGE UP /HPF (ref 0–5)

## 2022-01-25 PROCEDURE — 72146 MRI CHEST SPINE W/O DYE: CPT | Mod: 26,MA

## 2022-01-25 PROCEDURE — 99220: CPT

## 2022-01-25 PROCEDURE — 72148 MRI LUMBAR SPINE W/O DYE: CPT | Mod: 26,MA

## 2022-01-25 RX ORDER — KETOROLAC TROMETHAMINE 30 MG/ML
30 SYRINGE (ML) INJECTION ONCE
Refills: 0 | Status: DISCONTINUED | OUTPATIENT
Start: 2022-01-25 | End: 2022-01-25

## 2022-01-25 RX ORDER — DIAZEPAM 5 MG
5 TABLET ORAL ONCE
Refills: 0 | Status: DISCONTINUED | OUTPATIENT
Start: 2022-01-25 | End: 2022-01-25

## 2022-01-25 RX ORDER — DIAZEPAM 5 MG
5 TABLET ORAL ONCE
Refills: 0 | Status: DISCONTINUED | OUTPATIENT
Start: 2022-01-25 | End: 2022-01-26

## 2022-01-25 RX ORDER — SODIUM CHLORIDE 9 MG/ML
1000 INJECTION INTRAMUSCULAR; INTRAVENOUS; SUBCUTANEOUS
Refills: 0 | Status: DISCONTINUED | OUTPATIENT
Start: 2022-01-25 | End: 2022-01-26

## 2022-01-25 RX ADMIN — Medication 5 MILLIGRAM(S): at 09:35

## 2022-01-25 RX ADMIN — Medication 30 MILLIGRAM(S): at 10:05

## 2022-01-25 RX ADMIN — Medication 1 MILLIGRAM(S): at 19:40

## 2022-01-25 RX ADMIN — Medication 30 MILLIGRAM(S): at 09:35

## 2022-01-25 RX ADMIN — Medication 30 MILLIGRAM(S): at 19:53

## 2022-01-25 RX ADMIN — SODIUM CHLORIDE 200 MILLILITER(S): 9 INJECTION INTRAMUSCULAR; INTRAVENOUS; SUBCUTANEOUS at 04:56

## 2022-01-25 RX ADMIN — Medication 30 MILLIGRAM(S): at 19:23

## 2022-01-25 NOTE — ED ADULT NURSE REASSESSMENT NOTE - CONDITION
A & O x 3. Ambulatory. Report given to ERIK Davalos aware of SBP 80's. Denies c/o dizzyness. NAD noted. Transported to CDU via stretcher.

## 2022-01-25 NOTE — ED CDU PROVIDER INITIAL DAY NOTE - PROGRESS NOTE DETAILS
Pt reassessed this morning, continues to have left flank pain with radiation into the groin, but also radiates into the lower back and L SI joint, +pain with straight leg raise. Suspicion for msk etiology given chronicity of pain. Valium and Toradol given with improvement of sx. MRI L spine ordered to eval for possible radiculopathy as cause of sx. Pt does also admit to a hx of UTIs/kidney infections as a child, so will follow up ua/uc to ensure no infx.
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
## 2022-01-25 NOTE — ED CDU PROVIDER INITIAL DAY NOTE - PHYSICAL EXAMINATION
CONSTITUTIONAL: NAD, awake, alert  HEAD: Normocephalic; atraumatic  ENMT: External appears normal, MMM  NECK: no tenderness, FROM  CARD: Normal Sl, S2; no audible murmurs  RESP: normal wob, lungs ctab  ABD: soft, non-distended; R CVA, RLQ Tenderness  : PHILIP Jorge at bedside, no dmt, no adnexal tenderness, no discharge   MSK: no edema, normal ROM in all four extremities   NEURO: aaox3, moving all extremities spontaneously  - ps eber

## 2022-01-25 NOTE — ED CDU PROVIDER INITIAL DAY NOTE - NS ED ROS FT
Constitutional:  (-) fever, (-) chills, (-) lethargy  ENMT: (-) nasal discharge, (-) sore throat, (-) neck pain or stiffness  Cardiac: (-) chest pain (-) palpitations  Respiratory:  (-) cough (-) SOB  GI:  (+) nausea (-) vomiting (-) diarrhea (+) abdominal pain, + R flank pain  :  (-) dysuria (-) frequency (-) burning, (-) vaginal bleeding  MS:  (-) back pain (-) joint pain  Neuro:  (-) headache (-) numbness (-) tingling (-) focal weakness  - casi bowers

## 2022-01-25 NOTE — ED CDU PROVIDER INITIAL DAY NOTE - MEDICAL DECISION MAKING DETAILS
31F w h/o cholecystectomy p/w right flank pain radiating to the rlq, r groin. States she has had the pain intermittently for months. States since this morning it has been constant. Denies dysuria, vaginal bleeding, discharge, fevers, chills, sob, chest pain. Denies obstipation or constipation. States she feels nauseated but denies vomiting. Denies h/o renal stones.   In ER, labs were wnl, ct, u/s sowe dtrace fluid consistent with ovarian cyst, given morphine, toradol, fentanyl for pain, pt sent to cdu for pain control, monitoring

## 2022-01-26 VITALS
RESPIRATION RATE: 17 BRPM | OXYGEN SATURATION: 100 % | DIASTOLIC BLOOD PRESSURE: 70 MMHG | SYSTOLIC BLOOD PRESSURE: 103 MMHG | HEART RATE: 75 BPM | TEMPERATURE: 98 F

## 2022-01-26 LAB
CULTURE RESULTS: SIGNIFICANT CHANGE UP
SPECIMEN SOURCE: SIGNIFICANT CHANGE UP

## 2022-01-26 PROCEDURE — 99217: CPT

## 2022-01-26 RX ORDER — DIAZEPAM 5 MG
1 TABLET ORAL
Qty: 9 | Refills: 0
Start: 2022-01-26 | End: 2022-01-28

## 2022-01-26 RX ADMIN — SODIUM CHLORIDE 200 MILLILITER(S): 9 INJECTION INTRAMUSCULAR; INTRAVENOUS; SUBCUTANEOUS at 01:52

## 2022-01-26 NOTE — ED CDU PROVIDER SUBSEQUENT DAY NOTE - PHYSICAL EXAMINATION
CONSTITUTIONAL: NAD, awake, alert  	HEAD: Normocephalic; atraumatic  	ENMT: External appears normal, MMM  	NECK: no tenderness, FROM  	CARD: Normal Sl, S2; no audible murmurs  	RESP: normal wob, lungs ctab  	ABD: soft, non-distended; R CVA, RLQ Tenderness  	: PHILIP Jorge at bedside, no dmt, no adnexal tenderness, no discharge   	MSK: no edema, normal ROM in all four extremities   	NEURO: aaox3, moving all extremities spontaneously

## 2022-01-26 NOTE — ED CDU PROVIDER DISPOSITION NOTE - PATIENT PORTAL LINK FT
You can access the FollowMyHealth Patient Portal offered by [facility] by registering at the following website: [email or web address]. By joining [employer]’s FollowMyHealth portal, you will also be able to view your health information using other applications (apps) compatible with our system.

## 2022-01-26 NOTE — ED CDU PROVIDER DISPOSITION NOTE - CLINICAL COURSE
31yF w/pshx cholecystectomy presented to the ED with right sided flank/back and lower abdominal pain intermittently x months, had been worse yesterday. In main ED labs and ua within normal, TVUS and CT abd/pelvis without acute abnormality. Sent to CDU for MRI lumbar and thoracic spine given persistent pain. MRI showing mild disc bulge at L5-S1. This morning pt reports some improvement in pain, is ambulatory, no numbness/tingling, weakness. Will d/c home with pain medication, spine center and PMD follow up. She will return with any worsening or concerning symptoms.

## 2022-01-26 NOTE — ED CDU PROVIDER DISPOSITION NOTE - NSFOLLOWUPINSTRUCTIONS_ED_ALL_ED_FT
Follow up with your primary care doctor within 1 week, referral list provided  Follow up with the spine center within 1 week, referral list provided  Take Ibuprofen 600mg every 6-8 hours as needed for pain, take with food  -You can take Tylenol 650mg every 6 hours as needed for pain  For muscle spasm take Valium 5mg (1 tablet) every 8 hours as needed, caution drowsiness do not drive or drink alcohol while taking  Return to the ER with any worsening or concerning symptoms, increased pain, weakness, tingling, numbness, bowel or bladder incontinence or any other concerns.

## 2022-01-26 NOTE — ED CDU PROVIDER SUBSEQUENT DAY NOTE - HISTORY
32 Y/O F w h/o cholecystectomy p/w right flank pain radiating to RT leg and groin. In ER, labs were wnl, ct, u/s sowe trace fluid consistent with ovarian cyst. Persistent pain led to MRI of Lumabr spine demonstrating herniated disc. Pain management and re-evaluate in morning

## 2022-01-26 NOTE — ED CDU PROVIDER SUBSEQUENT DAY NOTE - NS ED ROS FT
: Constitutional:  (-) fever, (-) chills, (-) lethargy  	ENMT: (-) nasal discharge, (-) sore throat, (-) neck pain or stiffness  	Cardiac: (-) chest pain (-) palpitations  	Respiratory:  (-) cough (-) SOB  	GI:  (+) nausea (-) vomiting (-) diarrhea (+) abdominal pain, + R flank pain  	:  (-) dysuria (-) frequency (-) burning, (-) vaginal bleeding  	MS:  (-) back pain (-) joint pain  	Neuro:  (-) headache (-) numbness (-) tingling (-) focal weakness

## 2022-01-26 NOTE — ED CDU PROVIDER SUBSEQUENT DAY NOTE - PROGRESS NOTE DETAILS
Pt signed out to me by NAYAN Hernandez: 31yF w/pshx cholecystectomy presented to the ED with right sided flank/back and lower abdominal pain intermittently x months, had been worse yesterday. In main ED labs and ua within normal, TVUS and CT abd/pelvis without acute abnormality. Sent to CDU for MRI lumbar and thoracic spine given persistent pain. MRI showing mild disc bulge at L5-S1. This morning pt reports some improvement in pain, is ambulatory, no numbness/tingling, weakness. Likely d/c home with pain medication, spine center and PMD follow up. Pt feeling better today.  Imaging studies reviewed.  pt stable for dc with outpatient followup.

## 2022-04-18 ENCOUNTER — EMERGENCY (EMERGENCY)
Facility: HOSPITAL | Age: 32
LOS: 1 days | Discharge: HOME | End: 2022-04-18
Attending: EMERGENCY MEDICINE
Payer: MEDICAID

## 2022-04-18 VITALS
TEMPERATURE: 98 F | WEIGHT: 129.19 LBS | HEART RATE: 79 BPM | OXYGEN SATURATION: 98 % | RESPIRATION RATE: 15 BRPM | DIASTOLIC BLOOD PRESSURE: 66 MMHG | SYSTOLIC BLOOD PRESSURE: 100 MMHG | HEIGHT: 61 IN

## 2022-04-18 DIAGNOSIS — K81.9 CHOLECYSTITIS, UNSPECIFIED: Chronic | ICD-10-CM

## 2022-04-18 PROCEDURE — 99284 EMERGENCY DEPT VISIT MOD MDM: CPT

## 2022-04-18 PROCEDURE — 71046 X-RAY EXAM CHEST 2 VIEWS: CPT

## 2022-04-18 PROCEDURE — 71046 X-RAY EXAM CHEST 2 VIEWS: CPT | Mod: 26

## 2022-04-18 PROCEDURE — 99283 EMERGENCY DEPT VISIT LOW MDM: CPT | Mod: 25

## 2022-04-18 RX ORDER — IBUPROFEN 200 MG
600 TABLET ORAL ONCE
Refills: 0 | Status: COMPLETED | OUTPATIENT
Start: 2022-04-18 | End: 2022-04-18

## 2022-04-18 RX ORDER — LIDOCAINE 4 G/100G
1 CREAM TOPICAL
Qty: 1 | Refills: 0
Start: 2022-04-18 | End: 2022-04-24

## 2022-04-18 RX ADMIN — Medication 600 MILLIGRAM(S): at 18:17

## 2022-04-18 RX ADMIN — Medication 600 MILLIGRAM(S): at 17:17

## 2022-04-18 NOTE — ED PROVIDER NOTE - OBJECTIVE STATEMENT
pt with complaint of acute on chronic back pain now more severe over right latereral ribs and back  radiates to right lower leg no weakness no tingling

## 2022-04-18 NOTE — ED PROVIDER NOTE - PATIENT PORTAL LINK FT
You can access the FollowMyHealth Patient Portal offered by [facility] by registering at the following website: [email or web address]. By joining [employer]’s FollowMyHealth portal, you will also be able to view your health information using other applications (apps) compatible with our system.

## 2022-04-18 NOTE — ED ADULT TRIAGE NOTE - CHIEF COMPLAINT QUOTE
Right side back pain worst on movement for 1 month on and off Right side back pain worst on movement for 1 month on and off took Percocet with relief

## 2022-04-18 NOTE — ED PROVIDER NOTE - CLINICAL SUMMARY MEDICAL DECISION MAKING FREE TEXT BOX
pt with complaint of acute on chronic back pain  will get xray of chest as pain is over ritght ribs with point tenderness pain meds and reassess

## 2022-04-18 NOTE — ED PROVIDER NOTE - NSFOLLOWUPINSTRUCTIONS_ED_ALL_ED_FT
Acute Back Pain, Adult      Acute back pain is sudden and usually short-lived. It is often caused by an injury to the muscles and tissues in the back. The injury may result from:  •A muscle or ligament getting overstretched or torn (strained). Ligaments are tissues that connect bones to each other. Lifting something improperly can cause a back strain.      •Wear and tear (degeneration) of the spinal disks. Spinal disks are circular tissue that provide cushioning between the bones of the spine (vertebrae).      •Twisting motions, such as while playing sports or doing yard work.      •A hit to the back.      •Arthritis.      You may have a physical exam, lab tests, and imaging tests to find the cause of your pain. Acute back pain usually goes away with rest and home care.      Follow these instructions at home:    Managing pain, stiffness, and swelling     •Treatment may include medicines for pain and inflammation that are taken by mouth or applied to the skin, prescription pain medicine, or muscle relaxants. Take over-the-counter and prescription medicines only as told by your health care provider.    •Your health care provider may recommend applying ice during the first 24–48 hours after your pain starts. To do this:  •Put ice in a plastic bag.      •Place a towel between your skin and the bag.      •Leave the ice on for 20 minutes, 2–3 times a day.      •If directed, apply heat to the affected area as often as told by your health care provider. Use the heat source that your health care provider recommends, such as a moist heat pack or a heating pad.  •Place a towel between your skin and the heat source.      •Leave the heat on for 20–30 minutes.      •Remove the heat if your skin turns bright red. This is especially important if you are unable to feel pain, heat, or cold. You have a greater risk of getting burned.          Activity      • Do not stay in bed. Staying in bed for more than 1–2 days can delay your recovery.    •Sit up and stand up straight. Avoid leaning forward when you sit or hunching over when you stand.  •If you work at a desk, sit close to it so you do not need to lean over. Keep your chin tucked in. Keep your neck drawn back, and keep your elbows bent at a 90-degree angle (right angle).      •Sit high and close to the steering wheel when you drive. Add lower back (lumbar) support to your car seat, if needed.        •Take short walks on even surfaces as soon as you are able. Try to increase the length of time you walk each day.      • Do not sit, drive, or  one place for more than 30 minutes at a time. Sitting or standing for long periods of time can put stress on your back.      • Do not drive or use heavy machinery while taking prescription pain medicine.    •Use proper lifting techniques. When you bend and lift, use positions that put less stress on your back:  •Bend your knees.      •Keep the load close to your body.      •Avoid twisting.        •Exercise regularly as told by your health care provider. Exercising helps your back heal faster and helps prevent back injuries by keeping muscles strong and flexible.      •Work with a physical therapist to make a safe exercise program, as recommended by your health care provider. Do any exercises as told by your physical therapist.      Lifestyle     •Maintain a healthy weight. Extra weight puts stress on your back and makes it difficult to have good posture.      •Avoid activities or situations that make you feel anxious or stressed. Stress and anxiety increase muscle tension and can make back pain worse. Learn ways to manage anxiety and stress, such as through exercise.      General instructions     •Sleep on a firm mattress in a comfortable position. Try lying on your side with your knees slightly bent. If you lie on your back, put a pillow under your knees.    •Follow your treatment plan as told by your health care provider. This may include:  •Cognitive or behavioral therapy.      •Acupuncture or massage therapy.      •Meditation or yoga.          Contact a health care provider if:    •You have pain that is not relieved with rest or medicine.      •You have increasing pain going down into your legs or buttocks.      •Your pain does not improve after 2 weeks.      •You have pain at night.      •You lose weight without trying.      •You have a fever or chills.        Get help right away if:    •You develop new bowel or bladder control problems.      •You have unusual weakness or numbness in your arms or legs.      •You develop nausea or vomiting.      •You develop abdominal pain.      •You feel faint.        Summary    •Acute back pain is sudden and usually short-lived.      •Use proper lifting techniques. When you bend and lift, use positions that put less stress on your back.      •Take over-the-counter and prescription medicines and apply heat or ice as directed by your health care provider.      This information is not intended to replace advice given to you by your health care provider. Make sure you discuss any questions you have with your health care provider.      Document Revised: 09/07/2021 Document Reviewed: 09/10/2021

## 2022-04-23 ENCOUNTER — TRANSCRIPTION ENCOUNTER (OUTPATIENT)
Age: 32
End: 2022-04-23

## 2022-04-27 ENCOUNTER — TRANSCRIPTION ENCOUNTER (OUTPATIENT)
Age: 32
End: 2022-04-27

## 2022-07-12 NOTE — ED ADULT NURSE NOTE - NS ED NURSE DISCH DISPOSITION
Patient is scheduled for a colonoscopy with Dr Payne on [id number]. Please send Nulytely prep to [facility] on [address] In [place].  Please place COVID order / Scheduled.    Thank you, GI Preadmit Surgery Scheduler  Discharged

## 2022-10-11 ENCOUNTER — EMERGENCY (EMERGENCY)
Facility: HOSPITAL | Age: 32
LOS: 1 days | Discharge: HOME | End: 2022-10-11
Attending: EMERGENCY MEDICINE
Payer: SELF-PAY

## 2022-10-11 VITALS
HEART RATE: 79 BPM | WEIGHT: 125 LBS | RESPIRATION RATE: 18 BRPM | TEMPERATURE: 98 F | DIASTOLIC BLOOD PRESSURE: 74 MMHG | HEIGHT: 61 IN | SYSTOLIC BLOOD PRESSURE: 114 MMHG | OXYGEN SATURATION: 98 %

## 2022-10-11 DIAGNOSIS — K81.9 CHOLECYSTITIS, UNSPECIFIED: Chronic | ICD-10-CM

## 2022-10-11 LAB
APPEARANCE UR: CLEAR — SIGNIFICANT CHANGE UP
BILIRUB UR-MCNC: NEGATIVE — SIGNIFICANT CHANGE UP
COLOR SPEC: YELLOW — SIGNIFICANT CHANGE UP
DIFF PNL FLD: ABNORMAL
GLUCOSE UR QL: NEGATIVE — SIGNIFICANT CHANGE UP
HCG UR QL: NEGATIVE — SIGNIFICANT CHANGE UP
KETONES UR-MCNC: NEGATIVE — SIGNIFICANT CHANGE UP
LEUKOCYTE ESTERASE UR-ACNC: NEGATIVE — SIGNIFICANT CHANGE UP
NITRITE UR-MCNC: NEGATIVE — SIGNIFICANT CHANGE UP
PH UR: 6 — SIGNIFICANT CHANGE UP (ref 5–8)
PROT UR-MCNC: NEGATIVE — SIGNIFICANT CHANGE UP
SP GR SPEC: 1.01 — SIGNIFICANT CHANGE UP (ref 1.01–1.02)
UROBILINOGEN FLD QL: NEGATIVE — SIGNIFICANT CHANGE UP

## 2022-10-11 PROCEDURE — 81001 URINALYSIS AUTO W/SCOPE: CPT

## 2022-10-11 PROCEDURE — 96372 THER/PROPH/DIAG INJ SC/IM: CPT

## 2022-10-11 PROCEDURE — 81025 URINE PREGNANCY TEST: CPT

## 2022-10-11 PROCEDURE — 99284 EMERGENCY DEPT VISIT MOD MDM: CPT | Mod: 25

## 2022-10-11 PROCEDURE — 74176 CT ABD & PELVIS W/O CONTRAST: CPT | Mod: 26,MA

## 2022-10-11 PROCEDURE — 74176 CT ABD & PELVIS W/O CONTRAST: CPT | Mod: MA

## 2022-10-11 PROCEDURE — 99284 EMERGENCY DEPT VISIT MOD MDM: CPT

## 2022-10-11 PROCEDURE — 87086 URINE CULTURE/COLONY COUNT: CPT

## 2022-10-11 RX ORDER — KETOROLAC TROMETHAMINE 30 MG/ML
30 SYRINGE (ML) INJECTION ONCE
Refills: 0 | Status: DISCONTINUED | OUTPATIENT
Start: 2022-10-11 | End: 2022-10-11

## 2022-10-11 RX ADMIN — Medication 30 MILLIGRAM(S): at 20:36

## 2022-10-11 NOTE — ED ADULT TRIAGE NOTE - CHIEF COMPLAINT QUOTE
c/o bilateral flank/ back pain x 4 days worse at night + nausea in the morning , see pmd and was told to go to the ER

## 2022-10-11 NOTE — ED PROVIDER NOTE - PATIENT PORTAL LINK FT
You can access the FollowMyHealth Patient Portal offered by [facility] by registering at the following website: [email or web address]. By joining [facility] FollowMyHealth portal, you will also be able to view your health information using other applications (apps) compatible with our system.

## 2022-10-11 NOTE — ED PROVIDER NOTE - OBJECTIVE STATEMENT
32 year old female with PMHX of sciatica and PSHx of cholecystectomy, shoulder surgery, and  presents to the ED with complaints of bilateral flank/ back pain shooting down her legs for about a week and a half. Patient notes some associated nausea in the morning, and reports feeling tired due to not being able to sleep due to the discomfort. Patient denies any hematuria, chest pain, shortness of breath, abdominal pain, dysuria, sore throat, runny nose, and all other acute complaints. Patient endorses that she consulted her PMD regarding her symptoms who told her that the pain is musculoskeletal in nature, and was told to take Tylenol which she states has not been helping. Patient denies having any history of kidney stones in the past.   Allergies: Oxycodone (pruritis)

## 2022-10-11 NOTE — ED ADULT NURSE NOTE - ED STAT RN HANDOFF DETAILS
04-Jun-2022 12:56
pt stable reports pain is improving no acute distress noted endorsed to Verona PALACIOS

## 2022-10-11 NOTE — ED PROVIDER NOTE - INTERNATIONAL TRAVEL
05/04/2020      In an effort to protect our immunocompromised patients from potential exposure to COVID-19, [facility] will now require all patients receiving an infusion, an injection, and/or radiation therapy to be tested for COVID-19 prior to their appointment.  All patients currently under treatment will be tested immediately, and patients initiating new treatment cycles or with one-time appointments (injections, transfusions, etc.) must be tested within 72 hours of their appointment.     Placed COVID-19 test order for patient.  A member of our team is to contact the patient in the near future to explain this process and the rationale behind it, to ask the COVID-19 screening questions, and to get the patient scheduled for their COVID-19 test.     The above was completed in accordance with instructions and guidelines set forth by [facility].     Signed,    Brody eLvy, KT     Date:  05/04/2020     No

## 2022-10-11 NOTE — ED PROVIDER NOTE - NSICDXPASTSURGICALHX_GEN_ALL_CORE_FT
PAST SURGICAL HISTORY:   delivery delivered     Delivery of first pregnancy by  section 2011 wt 8#5- failure to dialate    Inflammation gall bladder

## 2022-10-11 NOTE — ED PROVIDER NOTE - PROGRESS NOTE DETAILS
Bryce: Signout from Dr. Starr to follow-up CT renal study and reassessment Only: CT without evidence of renal stone  DC with outpatient follow-up Wu: CT without evidence of renal stone  DC with outpatient follow-up Bryce: Patient is extremely upset about the results of her CAT scan.  She feels that she is having an emergency that we have not identified.  States that last time she had issues with her gallbladder she was sent home and had to come back.  I attempted to explain the patient that her urine and CAT scan do not suggest emergent pathology however she is extremely frustrated and states she will seek evaluation at another hospital.

## 2022-10-11 NOTE — ED ADULT NURSE NOTE - NEURO BEHAVIOR
Nikita Villegas)  Surgery; Thoracic Surgery  [phone] [place] [address], [place] [address]  Phone: [phone]  Fax: [phone]  Established Patient  Follow Up Time: 2 weeks  
calm

## 2022-10-11 NOTE — ED PROVIDER NOTE - CLINICAL SUMMARY MEDICAL DECISION MAKING FREE TEXT BOX
Differential: Musculoskeletal pain vs. sciatica. Will rule out kidney stone vs. UTI.   Plan: Will check urinalysis and urine culture, give Toradol, and reassess.

## 2022-10-11 NOTE — ED ADULT NURSE NOTE - NSIMPLEMENTINTERV_GEN_ALL_ED
Telemetry Implemented All Universal Safety Interventions:  [place] to call system. Call bell, personal items and telephone within reach. Instruct patient to call for assistance. Room bathroom lighting operational. Non-slip footwear when patient is off stretcher. Physically safe environment: no spills, clutter or unnecessary equipment. Stretcher in lowest position, wheels locked, appropriate side rails in place.

## 2022-10-12 VITALS
DIASTOLIC BLOOD PRESSURE: 55 MMHG | SYSTOLIC BLOOD PRESSURE: 90 MMHG | RESPIRATION RATE: 18 BRPM | TEMPERATURE: 98 F | HEART RATE: 68 BPM | OXYGEN SATURATION: 97 %

## 2022-10-12 PROBLEM — I63.9 CEREBRAL INFARCTION, UNSPECIFIED: Chronic | Status: ACTIVE | Noted: 2020-09-13

## 2022-10-12 PROBLEM — I63.9 CEREBRAL INFARCTION, UNSPECIFIED: Chronic | Status: ACTIVE | Noted: 2019-07-21

## 2022-10-13 LAB
CULTURE RESULTS: SIGNIFICANT CHANGE UP
SPECIMEN SOURCE: SIGNIFICANT CHANGE UP

## 2022-12-15 PROBLEM — Z00.00 ENCOUNTER FOR PREVENTIVE HEALTH EXAMINATION: Status: ACTIVE | Noted: 2022-12-15

## 2022-12-28 NOTE — ED ADULT TRIAGE NOTE - NS ED NOTE AC HIGH RISK COUNTRIES
Dr. Madeline Rodriguez at bedside performing NP bronchoscopy.   Patient tolerated well     Mandi Stearns RN  12/28/22 [id number] No

## 2022-12-28 NOTE — ED ADULT NURSE NOTE - NURSING MUSC STRENGTH
Instructions: This plan will send the code FBSE to the PM system.  DO NOT or CHANGE the price.
Detail Level: Generalized
Price (Do Not Change): 0.00
hand grasp, leg strength strong and equal bilaterally

## 2023-01-30 NOTE — ED ADULT NURSE NOTE - AS O2 DELIVERY
You can access the FollowMyHealth Patient Portal offered by [facility] by registering at the following website: [email or web address]. By joining [facility]’s FollowMyHealth portal, you will also be able to view your health information using other applications (apps) compatible with our system. room air

## 2023-06-07 NOTE — PRE-OP CHECKLIST - SIDE RAILS UP
Chief Complaint   Patient presents with   • Video Visit     Discuss weight loss/ life style changes for for future pregnancy     HISTORY OF PRESENT ILLNESS:  36 year old female who is being seen through video audio telecommunications soon meeting for concerns of weight.  She is interested in trying to lose 20-30 lb in the next 5-6 months.  In November she is planning on possibly trying artificial insemination.  Her gynecologist feel as if she will have a better chance of success with some weight loss.  She is never used pills to try to lose weight but is looking for some help along with exercise and dietary changes which she is planning on implementing.  She is overall feeling well and healthy.  She denies any chest pain shortness O breath fevers or chills body aches or other issues.    Past Medical History:   Diagnosis Date   • Seizures (CMD)      Past Surgical History:   Procedure Laterality Date   • ------------other------------- Right     right arm, birth control implant   • Removal gallbladder     • Tendon release       Social History     Socioeconomic History   • Marital status: Single     Spouse name: Not on file   • Number of children: Not on file   • Years of education: Not on file   • Highest education level: Not on file   Occupational History   • Not on file   Tobacco Use   • Smoking status: Never   • Smokeless tobacco: Never   Vaping Use   • Vaping Use: never used   Substance and Sexual Activity   • Alcohol use: Yes     Alcohol/week: 1.0 standard drink of alcohol     Types: 1 Glasses of wine per week     Comment: social-wine   • Drug use: No   • Sexual activity: Not Currently     Birth control/protection: Implant     Comment: Nexplanon   Other Topics Concern   • Not on file   Social History Narrative   • Not on file     Social Determinants of Health     Financial Resource Strain: Not on file   Food Insecurity: Not on file   Transportation Needs: Not on file   Physical Activity: Not on file   Stress: Not  on file   Social Connections: Not on file   Intimate Partner Violence: Not At Risk (12/16/2021)    Intimate Partner Violence    • Social Determinants: Intimate Partner Violence Past Fear: No    • Social Determinants: Intimate Partner Violence Current Fear: No     ALLERGIES:  No Known Allergies  Family History   Problem Relation Age of Onset   • Cancer Father 63        prostate   • Leukemia Maternal Grandfather      Review of patient's family status indicates:    Mother                         Alive                     Father                                                   Maternal Grandfather           Alive                     Current Outpatient Medications   Medication Sig Dispense Refill   • phentermine (ADIPEX-P) 37.5 MG tablet Take 1 tablet by mouth daily (before breakfast). 30 tablet 1   • etonogestrel (NEXPLANON) 68 MG implant 1 each by Subdermal route 1 time.     • acetaminophen (TYLENOL) 500 MG tablet Take 500 mg by mouth as needed.     • lamoTRIgine (LAMICTAL) 100 MG tablet Take 200 mg by mouth 2 times daily. Take bid     • levETIRAcetam (KEPPRA) 500 MG tablet Take 1,000 mg by mouth 2 times daily.       No current facility-administered medications for this visit.       REVIEW OF SYSTEMS:    See History of Present Illness, otherwise, all systems reviewed and are negative.      PHYSICAL EXAM:  Visit Vitals  LMP 05/20/2023 (Exact Date)       Psychiatric:  Speech and behavior appropriate.    ASSESSMENT:  Obesity (BMI 30-39.9)  (primary encounter diagnosis)  She would like to start a weight loss pill in hopes of losing weight prior to trying artificial insemination later this fall.  Will plan to place her phentermine for the next 2 months follow-up in 2 months if things are going well will continue phentermine for 4 months total    Seizures (CMD)  As per her neurologist    Today's visit was done through [employer] audio video telecommunications lasting for 7 minutes   done

## 2023-07-13 NOTE — PROGRESS NOTE ADULT - REASON FOR ADMISSION
Pt presents for B12 injection, offers no complaints, tolerated injection to right arm, AVS declined aware of next appt, d/c from unit stable Acute Cholecystitis

## 2024-10-28 NOTE — ED ADULT TRIAGE NOTE - CHIEF COMPLAINT QUOTE
Pt c/o epigastric pain and nausea x3 hrs PTA. Hx gallstones, CVA no residual deficits. Pt appears uncomfortable.
28-Oct-2024 17:21
O-L Flap Text: The defect edges were debeveled with a #15 scalpel blade.  Given the location of the defect, shape of the defect and the proximity to free margins an O-L flap was deemed most appropriate.  Using a sterile surgical marker, an appropriate advancement flap was drawn incorporating the defect and placing the expected incisions within the relaxed skin tension lines where possible.    The area thus outlined was incised deep to adipose tissue with a #15 scalpel blade.  The skin margins were undermined to an appropriate distance in all directions utilizing iris scissors.

## 2024-11-11 NOTE — ED PROVIDER NOTE - CLINICAL SUMMARY MEDICAL DECISION MAKING FREE TEXT BOX
Introduction Text (Please End With A Colon): The following procedure was deferred: 
Detail Level: Detailed
Procedure To Be Performed At Next Visit: Biopsy by shave method
Size Of Lesion In Cm (Optional): 0.2
pt with r/u pain and h/o gallstones  -cbc, cmp, lipase, ua/ucx, ucg, zofran, pain control, ruq sono

## 2025-01-28 NOTE — H&P ADULT - NSHPLABSRESULTS_GEN_ALL_CORE
LABS:                        12.9   x     )-----------( x        ( 13 Feb 2021 04:54 )             39.3     13 Feb 2021 00:16    137    |  102    |  13     ----------------------------<  85     3.7     |  26     |  0.53     Ca    9.3        13 Feb 2021 00:16    TPro  6.7    /  Alb  3.9    /  TBili  0.4    /  DBili  x      /  AST  22     /  ALT  18     /  AlkPhos  94     13 Feb 2021 00:16    PT/INR - ( 13 Feb 2021 00:16 )   PT: 11.9 sec;   INR: 0.99 ratio         PTT - ( 13 Feb 2021 00:16 )  PTT:32.3 sec    LIVER FUNCTIONS - ( 13 Feb 2021 00:16 )  Alb: 3.9 g/dL / Pro: 6.7 g/dL / ALK PHOS: 94 U/L / ALT: 18 U/L / AST: 22 U/L / GGT: x           IMAGING:    CT Head No Cont (02.13.21 @ 01:38)  FINDINGS:  HEAD:  No acute intracranial hemorrhage, mass effect or midline shift.  Ventricles and cortical sulci are within normal limits.  The visualized paranasal sinuses are clear. The mastoid air cells and middle ear cavities are clear.  No displaced calvarial fracture.    CERVICAL SPINE:  There is no evidence for acute fracture, subluxation, or prevertebral soft tissue swelling.  Straightening of normal cervical lordosis likely related to muscle spasm or positioning. Vertebral body heights are maintained. Intervertebral disc space is maintained.  Limited evaluation of spinal canal given CT modality.  Visualized portions of the lungs are clear.    IMPRESSION:  HEAD CT:  No acute intracranial hemorrhage, mass effect or midline shift. No displaced calvarial fracture.  CERVICAL SPINE CT:  No evidence for acute fracture or traumatic malalignment.    CT Chest w/ IV Cont (02.13.21 @ 01:44)  FINDINGS:  CHEST:  LUNGS AND LARGE AIRWAYS: No endobronchial lesions. Clear lungs. Mild bibasilar dependent atelectasis.  PLEURA: No pleural effusion. No pneumothorax.  VESSELS: Within normal limits. No main, right and left main or lobar pulmonary embolus. Limited evaluation of the distal branches.  HEART: Heart size is normal. No pericardial effusion.  MEDIASTINUM AND DAVE: No lymphadenopathy.  CHEST WALL AND LOWER NECK: Within normal limits.    ABDOMEN AND PELVIS:  LIVER: Within normal limits.  BILE DUCTS: Normal caliber.  GALLBLADDER: Cholecystectomy.  SPLEEN: Within normal limits.  PANCREAS: Within normal limits.  ADRENALS: Within normal limits.  KIDNEYS/URETERS: Within normal limits.    BLADDER: Within normal limits.  REPRODUCTIVE ORGANS: Uterus within normal limits. 2.9 cm left adnexal cyst.    BOWEL: No bowel obstruction. Appendix is normal.  PERITONEUM: No ascites.  VESSELS: Within normal limits.  RETROPERITONEUM/LYMPH NODES: No lymphadenopathy.  ABDOMINAL WALL: Within normal limits.  BONES: No acute fracture or dislocation. Normal thoracic kyphosis and lumbar lordosis. No traumatic subluxation. Vertebral body heights are maintained. Intervertebral disc spaces are maintained. Limited evaluation of the spinal canal secondary to CT modality.    IMPRESSION:  No sequelae of trauma within the chest, abdomen and pelvis. Increased Nutrient Needs...